# Patient Record
Sex: FEMALE | Race: OTHER | Employment: FULL TIME | URBAN - METROPOLITAN AREA
[De-identification: names, ages, dates, MRNs, and addresses within clinical notes are randomized per-mention and may not be internally consistent; named-entity substitution may affect disease eponyms.]

---

## 2019-02-07 ASSESSMENT — PAIN DESCRIPTION - PAIN TYPE: TYPE: ACUTE PAIN

## 2019-02-07 ASSESSMENT — PAIN SCALES - GENERAL: PAINLEVEL_OUTOF10: 9

## 2019-02-07 ASSESSMENT — PAIN DESCRIPTION - LOCATION: LOCATION: HEAD

## 2019-02-08 ENCOUNTER — HOSPITAL ENCOUNTER (EMERGENCY)
Age: 24
Discharge: HOME | End: 2019-02-08
Attending: EMERGENCY MEDICINE
Payer: COMMERCIAL

## 2019-02-08 ENCOUNTER — APPOINTMENT (OUTPATIENT)
Dept: CT IMAGING | Age: 24
End: 2019-02-08
Payer: COMMERCIAL

## 2019-02-08 ENCOUNTER — APPOINTMENT (OUTPATIENT)
Dept: GENERAL RADIOLOGY | Age: 24
End: 2019-02-08
Payer: COMMERCIAL

## 2019-02-08 VITALS
TEMPERATURE: 98 F | RESPIRATION RATE: 16 BRPM | DIASTOLIC BLOOD PRESSURE: 68 MMHG | HEART RATE: 78 BPM | BODY MASS INDEX: 20.83 KG/M2 | HEIGHT: 65 IN | OXYGEN SATURATION: 100 % | WEIGHT: 125 LBS | SYSTOLIC BLOOD PRESSURE: 128 MMHG

## 2019-02-08 DIAGNOSIS — T07.XXXA MULTIPLE CONTUSIONS: ICD-10-CM

## 2019-02-08 DIAGNOSIS — S09.90XA CLOSED HEAD INJURY, INITIAL ENCOUNTER: Primary | ICD-10-CM

## 2019-02-08 LAB
PREGNANCY, URINE: NEGATIVE
SPECIFIC GRAVITY, URINE: 1.03 (ref 1–1.03)

## 2019-02-08 PROCEDURE — 72072 X-RAY EXAM THORAC SPINE 3VWS: CPT

## 2019-02-08 PROCEDURE — 99284 EMERGENCY DEPT VISIT MOD MDM: CPT

## 2019-02-08 PROCEDURE — 72110 X-RAY EXAM L-2 SPINE 4/>VWS: CPT

## 2019-02-08 PROCEDURE — 70450 CT HEAD/BRAIN W/O DYE: CPT

## 2019-02-08 PROCEDURE — 72125 CT NECK SPINE W/O DYE: CPT

## 2019-02-08 PROCEDURE — 81025 URINE PREGNANCY TEST: CPT

## 2019-02-08 RX ORDER — NAPROXEN 500 MG/1
500 TABLET ORAL 2 TIMES DAILY PRN
Qty: 20 TABLET | Refills: 0 | Status: ON HOLD | OUTPATIENT
Start: 2019-02-08 | End: 2022-03-17

## 2019-12-23 ENCOUNTER — HOSPITAL ENCOUNTER (OUTPATIENT)
Age: 24
Setting detail: SPECIMEN
Discharge: HOME | End: 2019-12-23
Payer: COMMERCIAL

## 2019-12-23 PROCEDURE — 80307 DRUG TEST PRSMV CHEM ANLYZR: CPT

## 2019-12-24 LAB
BENZODIAZEPINE SCREEN, URINE: NEGATIVE
CANNABINOID SCREEN URINE: NEGATIVE
COCAINE METABOLITE: NEGATIVE
OPIATES, URINE: NEGATIVE
OXYCODONE: NORMAL

## 2021-01-08 ENCOUNTER — HOSPITAL ENCOUNTER (OUTPATIENT)
Dept: PSYCHIATRY | Age: 26
Setting detail: THERAPIES SERIES
Discharge: HOME | End: 2021-01-08
Payer: COMMERCIAL

## 2021-01-08 PROCEDURE — 80305 DRUG TEST PRSMV DIR OPT OBS: CPT

## 2021-01-08 PROCEDURE — 90791 PSYCH DIAGNOSTIC EVALUATION: CPT

## 2021-01-08 ASSESSMENT — LIFESTYLE VARIABLES: HISTORY_ALCOHOL_USE: YES
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
## 2021-01-08 NOTE — PROGRESS NOTES
[address] [place] [address] Urinalysis Laboratory Testing and Medical History      Location: [x] [place] [] Art Shaker D. Albert Cushing, MD., [id number], Medical Director of [facility] Director orders for [address] [place] [address] clinical therapists to collect an urine sample from:    Client: Victoria Turner   : 1995   Case# [id number]    Urine sample will be collected following the collections guidelines provided on Clinical Reference Laboratory [facility] custody form, and completion of the [facility] Non-Federal chain of custody drug screening form. During the course of treatment, randomly a urine sample will be collected, at a minimum of one time a month, more frequently as needed, as part of the clinical outpatient alcohol and drug treatment program at [address] [place] [address]. Medical care recommendation for clients experiencing/reporting  medical concerns, who do not have a family physician and willing to attend  medical care will be assisted in seeking medical care. Clinical providers will refer clients to local family physicians practices as part of the  clients treatment plan and assist the client in gaining access to an appointment. Release of information will be requested to support the  clients seeking medical care. Summary of Medical History  Prior to Admission medications    Medication Sig Start Date End Date Taking?  Authorizing Provider   naproxen (NAPROSYN) 500 MG tablet Take 1 tablet by mouth 2 times daily as needed for Pain 19  Chuck Gaviria DO   predniSONE (DELTASONE) 10 MG tablet Take 6 tabs by mouth once daily for 3 days, then 4 tabs by mouth once daily for 3 days, then 3 tabs by mouth once daily for 3 days, then 2 tabs by mouth daily for 3 days, then 1 tab by mouth once daily for 3 days,     #48 8/15/18   BENTON Doss   diphenhydrAMINE (BENADRYL) 25 MG capsule Take 1 capsule by mouth every 6 hours as needed for Itching 8/15/18   BENTON Doss

## 2021-01-08 NOTE — PROGRESS NOTES
[facility]        Individual  Progress Note    Location: [x] [place] [] [place]                   Patient Name: Marisel Grey   : 1995     Case # :  [id number]  Therapist: Charles Lou        Objective/Service/Time: K 1 Assessment    S-Client is a 22year old  female on probation. Client has 5 probation officers. Her last charge was for possession of Suboxone. Client denies that she uses Suboxone. She completed [employer]  successfully in 2020. Client is on medical marijuana and reports she still drinks alcohol occasionally. O-Client was cooperative, her thought process was logical, her affect full, her mood euthymic, her affect full and speech normal. Client denies any hallucinations or delusions. No HI/SI. A-Completed intake paperwork, began assessment and administered initial UDS. Client was placed in Level 1 treatment. P-Client will return on 1/15/2021 at 8:00am to complete her assessment.          Electronically signed by Charles Lou on 2021 at 9:11 AM

## 2021-01-15 ENCOUNTER — HOSPITAL ENCOUNTER (OUTPATIENT)
Dept: PSYCHIATRY | Age: 26
Setting detail: THERAPIES SERIES
Discharge: HOME | End: 2021-01-15
Payer: COMMERCIAL

## 2021-01-15 NOTE — PROGRESS NOTES
[facility]        Individual  Progress Note    Location: [x] [place] [] [place]                   Patient Name: Amaury Garcia   : 1995     Case # :  [id number]  Therapist: Tony Mcmullen        Objective/Service/Time:     Client called at 7:34am and cancelled her individual session at 8:00am reporting she was too tired to come in, she works third shift.    Counselor called client and rescheduled her for 2021 at 2:00pm.        Electronically signed by Tony Mcmullen on 1/15/2021 at 7:55 AM

## 2021-01-19 ENCOUNTER — APPOINTMENT (OUTPATIENT)
Dept: PSYCHIATRY | Age: 26
End: 2021-01-19
Payer: COMMERCIAL

## 2021-01-19 ENCOUNTER — HOSPITAL ENCOUNTER (OUTPATIENT)
Dept: PSYCHIATRY | Age: 26
Setting detail: THERAPIES SERIES
Discharge: HOME | End: 2021-01-19
Payer: COMMERCIAL

## 2021-01-19 PROCEDURE — 90834 PSYTX W PT 45 MINUTES: CPT

## 2021-01-19 NOTE — PROGRESS NOTES
[address] [facility]        Individual  Progress Note    Location: [x] [place] [] [place]                   Patient Name: Alondra Duran   : 1995     Case # :  [id number]  Therapist: Anjelica Gautam        Objective/Service/Time: K 1 individual    S-Client is a 22year old  female on probation with 4 different PO's. Client is on medical marijuana. O-Client was cooperative, pleasant and oriented x 4 and shared information openly. A-Completed assessment, reviewed last UDS that was positive for [facility] and created a treatment plan. Client was placed in Level 1 group.     P-Client will begin group on Tuesday after her individual session at 2:00pm. Group runs from 3:00pm-5:30pm.         Electronically signed by Anjelica Gautam on 2021 at 2:43 PM
3) Client will obtain 3-5 phone numbers of sober support people and reach out weekly. Evaluation Date: 4/19/2021 Code: C Continue TBD     3. Problem: Legal involvement   a. Goal:  Client will complete required stipulations for probation in 90 days  b. Objectives:   i. 1) Client will participate in updated progress reports for legal system Evaluation Date: 4/19/2021 Code: C Continue TBD  ii. 2) Client will submit to any random UDS and pay on any court/probation fines Evaluation Date: 4/19/2021 Code: C Continue TBD                                 3) Client will meet with  for linkage and referral to                                            community resources Evaluation Date: 4/19/2021 Code: C Continue                               TBD       Defer: Client would like to move out of [place]    Discharge Plan/Instructions: Client will complete her treatment plan and maintain abstinence from all substances except Medical Marijuana. Kristyn Qureshi / 1995 has participated in the treatment plan development outlined above on 1/19/2021.      Selma Torres, [address]  1/19/2021/2:30 PM

## 2021-01-22 ENCOUNTER — APPOINTMENT (OUTPATIENT)
Dept: PSYCHIATRY | Age: 26
End: 2021-01-22
Payer: COMMERCIAL

## 2021-01-26 ENCOUNTER — APPOINTMENT (OUTPATIENT)
Dept: PSYCHIATRY | Age: 26
End: 2021-01-26
Payer: COMMERCIAL

## 2021-01-26 ENCOUNTER — HOSPITAL ENCOUNTER (OUTPATIENT)
Dept: PSYCHIATRY | Age: 26
Setting detail: THERAPIES SERIES
End: 2021-01-26
Payer: COMMERCIAL

## 2021-01-26 ENCOUNTER — HOSPITAL ENCOUNTER (OUTPATIENT)
Dept: PSYCHIATRY | Age: 26
Setting detail: THERAPIES SERIES
Discharge: HOME | End: 2021-01-26
Payer: COMMERCIAL

## 2021-01-26 PROCEDURE — 90834 PSYTX W PT 45 MINUTES: CPT
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
## 2021-01-29 ENCOUNTER — APPOINTMENT (OUTPATIENT)
Dept: PSYCHIATRY | Age: 26
End: 2021-01-29
Payer: COMMERCIAL

## 2021-02-02 ENCOUNTER — APPOINTMENT (OUTPATIENT)
Dept: PSYCHIATRY | Age: 26
End: 2021-02-02
Payer: COMMERCIAL

## 2021-02-02 ENCOUNTER — HOSPITAL ENCOUNTER (OUTPATIENT)
Dept: PSYCHIATRY | Age: 26
Setting detail: THERAPIES SERIES
Discharge: HOME | End: 2021-02-02
Payer: COMMERCIAL

## 2021-02-02 PROCEDURE — 90853 GROUP PSYCHOTHERAPY: CPT

## 2021-02-03 NOTE — GROUP NOTE
612 [facility] Group Therapy Note      2/3/2021    Location:  [employer]    Clients Presents:   [id number], [id number], 8421    Clients Absent: [id number]    Length of session: 1.5 hours    Group Note: OP    Group Type: Co-Ed    New members were welcomed and introduced. Norms and expectations of group were discussed. Content:  Stages of Change: Pre contemplation, Contemplation, Preparation, Action, Maintenance       KARY Quiros  6/7/5899 43:69 AM      Co-Therapist: N/A      [facility] Individual Group Progress Note    Alivia Baez  1995  2/3/2021    Notes on Client Progress in [facility] attended group, introduced herself and reviewed events leading to arrival: presented check information: discussed preparation stage: discussed the stress of being involved with multiple legal systems.        KARY Quiros  4/8/0885 78:96 AM         Co-Therapist: N/A

## 2021-02-05 ENCOUNTER — APPOINTMENT (OUTPATIENT)
Dept: PSYCHIATRY | Age: 26
End: 2021-02-05
Payer: COMMERCIAL

## 2021-02-09 ENCOUNTER — APPOINTMENT (OUTPATIENT)
Dept: PSYCHIATRY | Age: 26
End: 2021-02-09
Payer: COMMERCIAL

## 2021-02-09 ENCOUNTER — HOSPITAL ENCOUNTER (OUTPATIENT)
Dept: PSYCHIATRY | Age: 26
Setting detail: THERAPIES SERIES
Discharge: HOME | End: 2021-02-09
Payer: COMMERCIAL

## 2021-02-09 PROCEDURE — 90834 PSYTX W PT 45 MINUTES: CPT

## 2021-02-09 PROCEDURE — 90853 GROUP PSYCHOTHERAPY: CPT

## 2021-02-09 PROCEDURE — 80305 DRUG TEST PRSMV DIR OPT OBS: CPT

## 2021-02-09 NOTE — PROGRESS NOTES
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 Problem: Substance Use   a. Goal: Client will remain abstinent from all mood-altering substances except medical marijuana in 90 days. b. Objectives:   i. 1) Client will identify 10 problems caused and led to consequences and be able to process with her counselor once a week. Evaluation Date: 4/19/2021 Code: 2/9/2021 continue  ii. 2) Client will acknowledge 8 old people, places, and things that are unhealthy to her recovery journey to share once a week during his individual counseling session. Evaluation Date: 4/19/2021 Code: C Continue TBD  3)  Client will identify environments that are detrimental to her recovery and develop ways to avoid them during individual sessions one time each week with primary counselor. Evaluation Date: 4/19/2021 Code: C Continue TBD    2. Problem: Relapse prevention   a. Goal: Client will learn new relapse prevention skills to avoid relapsing in the future in 90 days  b. Objectives:   i. 1)  Client will complete a relapse prevention workbook and develop a relapse prevention plan and process with her counselor once a week. Evaluation Date: 4/19/2021 Code: C Continue TBD   ii. 2)  Client will stay compliant with medical marijuana doctor/prescription.   Evaluation Date: 4/19/2021 Code: 2/9/2021 continue 3) Client will obtain 3-5 phone numbers of sober support people and reach out weekly. Evaluation Date: 4/19/2021 Code: C Continue TBD     3. Problem: Legal involvement   a. Goal:  Client will complete required stipulations for probation in 90 days  b. Objectives:   i. 1) Client will participate in updated progress reports for legal system Evaluation Date: 4/19/2021 Code: 2/9/2021 continue  ii. 2) Client will submit to any random UDS and pay on any court/probation fines Evaluation Date: 4/19/2021 Code: C Continue TBD                                 3) Client will meet with  for linkage and referral to                                            community resources Evaluation Date: 4/19/2021 Code: C Continue                               TBD       Defer: Client would like to move out of [place]    Discharge Plan/Instructions: Client will complete her treatment plan and maintain abstinence from all substances except Medical Marijuana. Alivia Baez / 1995 has participated in the treatment plan development outlined above on 2/9/2021.      JEFF BallIII  2/9/2021/2:48 PM

## 2021-02-09 NOTE — PROGRESS NOTES
[address] [facility]        Individual  Progress Note    Location: [x] [place]                   Patient Name: Chandrakant Morocho   : 1995     Case # :  [id number]  Therapist: Basia Fofana        Objective/Service/Time: K 1 individual     Goal 1 Objective 1 continue  Goal 1 Objective 2 continue  Goal 3 Objective 1 continue    S-Client is a 22year old Bi Racial female on probation. Client reports she is still smoking marijuana with her MM card. Client shared she had court on the 4th and they took her off [address] [place] [address] and put her on community control. So her Felony 5 will be on her record. Client reports she is still working 3rd shift full time. Client denies any current obstacles. She is living between her mothers and her ex girlfriends home. She shared about the trouble drugs have been for her and she shared she has different goals for herself today. O-Client was cooperative, pleasant and oriented x 4. A-Client has good insight into her AoD use and minimizes the effects of marijuana and how it will hold her back with goals she would like to achieve. Counselor encouraged client to make a pro and con list.     P-Client will attend group at 3:00PM today.        Electronically signed by Basia Fofana on 2021 at 2:51 PM

## 2021-02-12 ENCOUNTER — APPOINTMENT (OUTPATIENT)
Dept: PSYCHIATRY | Age: 26
End: 2021-02-12
Payer: COMMERCIAL

## 2021-02-16 ENCOUNTER — HOSPITAL ENCOUNTER (OUTPATIENT)
Dept: PSYCHIATRY | Age: 26
Setting detail: THERAPIES SERIES
Discharge: HOME | End: 2021-02-16
Payer: COMMERCIAL

## 2021-02-16 ENCOUNTER — APPOINTMENT (OUTPATIENT)
Dept: PSYCHIATRY | Age: 26
End: 2021-02-16
Payer: COMMERCIAL

## 2021-02-16 NOTE — GROUP NOTE
[facility] Group Therapy Note      2/16/2021    Location:  [employer]    Clients Presents: 1676, 0731    Clients Absent: 9905, 9923, 9934, 3974    Length of session: 1.5 hours    Group Note: OP    Group Type: Co-Ed    New members were welcomed and introduced. Norms and expectations of group were discussed. Content: Counselor facilitated client check in information. Counselor presented a topic focused discussion on fear. Client identified how fear has affected his life in addiction and in recovery. Client identified coping skills to overcome fear.      [facility]  2/16/2021 2:34 PM    Co-Therapist: N/A      [facility] Individual Group Progress Note    Frances Jackson  1995 2/16/2021    Notes on Client Progress in Group    Reason for Absence: cancelled due to weather    [facility]  2/16/2021 4:23 PM    Co-Therapist: N/A

## 2021-02-19 ENCOUNTER — APPOINTMENT (OUTPATIENT)
Dept: PSYCHIATRY | Age: 26
End: 2021-02-19
Payer: COMMERCIAL

## 2021-02-23 ENCOUNTER — HOSPITAL ENCOUNTER (OUTPATIENT)
Dept: PSYCHIATRY | Age: 26
Setting detail: THERAPIES SERIES
Discharge: HOME | End: 2021-02-23
Payer: COMMERCIAL

## 2021-02-23 ENCOUNTER — APPOINTMENT (OUTPATIENT)
Dept: PSYCHIATRY | Age: 26
End: 2021-02-23
Payer: COMMERCIAL

## 2021-02-23 PROCEDURE — 90834 PSYTX W PT 45 MINUTES: CPT

## 2021-02-23 PROCEDURE — 90853 GROUP PSYCHOTHERAPY: CPT

## 2021-02-23 NOTE — PROGRESS NOTES
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 Problem: Substance Use   a. Goal: Client will remain abstinent from all mood-altering substances except medical marijuana in 90 days. b. Objectives:   i. 1) Client will identify 10 problems caused and led to consequences and be able to process with her counselor once a week. Evaluation Date: 4/19/2021 Code: 2/9/2021 continue  ii. 2) Client will acknowledge 8 old people, places, and things that are unhealthy to her recovery journey to share once a week during his individual counseling session. Evaluation Date: 4/19/2021 Code: 2/23/2021 achieved  3)  Client will identify environments that are detrimental to her recovery and develop ways to avoid them during individual sessions one time each week with primary counselor. Evaluation Date: 4/19/2021 Code: C Continue TBD    2. Problem: Relapse prevention   a. Goal: Client will learn new relapse prevention skills to avoid relapsing in the future in 90 days  b. Objectives:   i. 1)  Client will complete a relapse prevention workbook and develop a relapse prevention plan and process with her counselor once a week. Evaluation Date: 4/19/2021 Code: C Continue TBD   ii. 2)  Client will stay compliant with medical marijuana doctor/prescription.   Evaluation Date: 4/19/2021 Code: 2/23/2021 continue 3) Client will obtain 3-5 phone numbers of sober support people and reach out weekly. Evaluation Date: 4/19/2021 Code: C Continue TBD     3. Problem: Legal involvement   a. Goal:  Client will complete required stipulations for probation in 90 days  b. Objectives:   i. 1) Client will participate in updated progress reports for legal system Evaluation Date: 4/19/2021 Code: 2/23/2021 continue  ii. 2) Client will submit to any random UDS and pay on any court/probation fines Evaluation Date: 4/19/2021 Code: C Continue TBD                                 3) Client will meet with  for linkage and referral to                                            community resources Evaluation Date: 4/19/2021 Code: C Continue                               TBD       Defer: Client would like to move out of [place]    Discharge Plan/Instructions: Client will complete her treatment plan and maintain abstinence from all substances except Medical Marijuana. Nehemias Goodman / 1995 has participated in the treatment plan development outlined above on 2/23/2021.      JEFF HansonIII  2/23/2021/2:13 PM

## 2021-02-23 NOTE — GROUP NOTE
[address] [facility] Group Therapy Note      2/23/2021    Location:  [employer]    Clients Presents: [id number], 2568, 9116    Clients Absent:  6589,  5723, 9990     Length of session: 1.5 hours    Group Note: OP    Group Type: Co-Ed    New members were welcomed and introduced. Norms and expectations of group were discussed. Content:  Consequences of mood altering substances. KARY Avelar  6/50/7571 2:85 PM        Co-Therapist: N/A      [facility] Individual Group Progress Note    Aroldo Velazquez  1995 2/23/2021    Notes on Client Progress in [address] attended session, introduced himself and events leading to arrival: presented check in information: expressed she does not desire to abstain from smoking marijuana, expressed plans to relocate to [place].            KARY Avelar  0/96/1730 3:84 PM         Co-Therapist: N/A

## 2021-02-23 NOTE — GROUP NOTE
[address] [facility] Group Therapy Note      2/23/2021    Location:  [employer]    Clients Presents: 3662, 4049, 2896    Clients Absent:  0196,  7471, 9912     Length of session: 1.5 hours    Group Note: OP    Group Type: Co-Ed    New members were welcomed and introduced. Norms and expectations of group were discussed. Content:  Consequences of mood altering substances. KARY Rojo  2/07/7405 1:17 PM        Co-Therapist: N/A      [facility] Individual Group Progress Note    Azalea Parks  1995 2/23/2021    Notes on Client Progress in [address] attended and participated in group, introduced herself and events leading to arrival: presented check information: expressed she desires to continue smoking marijuana; admitted she has unresolved issues but unwilling to discuss.             KARY Rojo  6/00/4079 5:69 PM           Co-Therapist: N/A

## 2021-02-23 NOTE — PROGRESS NOTES
[facility]        Individual  Progress Note    Location: [x] [place] [] [place]                   Patient Name: Kristyn Qureshi   : 1995     Case # :  [id number]  Therapist: Selma Torres        Objective/Service/Time: K 1 individual     Goal 1 Objective 2 achieved    S-Client is a 32year old  female. Client reports she is still using her medical marijuana. She shared she is still working at [employer] and is enjoying it. She denies any current obstacles or stressors. Client and counselor explored people, places, and things that are unhealthy for recovery. Client stated, \"I think bars for sure. Even gas stations are not good because I like to zapata. I don't really have any negative people any more\". O-Client was cooperative and pleasant. She shared her thoughts and feelings openly. A-Client has good insight when it comes to all substances except the medical marijuana. She has no intention of ever stopping. 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 Counselor encouraged client to explore her coping skills like spirituality, that she has mentioned she is interested in. P-Client will attend group at 4:00pm today.          Electronically signed by Selma Torres on 2021 at 2:45 PM

## 2021-02-26 ENCOUNTER — APPOINTMENT (OUTPATIENT)
Dept: PSYCHIATRY | Age: 26
End: 2021-02-26
Payer: COMMERCIAL

## 2021-03-01 ENCOUNTER — HOSPITAL ENCOUNTER (OUTPATIENT)
Dept: PSYCHIATRY | Age: 26
Setting detail: THERAPIES SERIES
Discharge: HOME | End: 2021-03-01
Payer: COMMERCIAL

## 2021-03-01 PROCEDURE — 90834 PSYTX W PT 45 MINUTES: CPT

## 2021-03-01 NOTE — PROGRESS NOTES
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 Problem: Substance Use   a. Goal: Client will remain abstinent from all mood-altering substances except medical marijuana in 90 days. b. Objectives:   i. 1) Client will identify 10 problems caused and led to consequences and be able to process with her counselor once a week. Evaluation Date: 4/19/2021 Code: 3/1/2021 achieved  ii. 2) Client will acknowledge 8 old people, places, and things that are unhealthy to her recovery journey to share once a week during his individual counseling session. Evaluation Date: 4/19/2021 Code: 2/23/2021 achieved  3)  Client will identify environments that are detrimental to her recovery and develop ways to avoid them during individual sessions one time each week with primary counselor. Evaluation Date: 4/19/2021 Code: C Continue TBD    2. Problem: Relapse prevention   a. Goal: Client will learn new relapse prevention skills to avoid relapsing in the future in 90 days  b. Objectives:   i. 1)  Client will complete a relapse prevention workbook and develop a relapse prevention plan and process with her counselor once a week. Evaluation Date: 4/19/2021 Code: C Continue TBD   ii. 2)  Client will stay compliant with medical marijuana doctor/prescription.   Evaluation Date: 4/19/2021 Code: 3/1/2021 continue 3) Client will obtain 3-5 phone numbers of sober support people and reach out weekly. Evaluation Date: 4/19/2021 Code: C Continue TBD     3. Problem: Legal involvement   a. Goal:  Client will complete required stipulations for probation in 90 days  b. Objectives:   i. 1) Client will participate in updated progress reports for legal system Evaluation Date: 4/19/2021 Code: 3/1/2021 continue  ii. 2) Client will submit to any random UDS and pay on any court/probation fines Evaluation Date: 4/19/2021 Code: C Continue TBD                                 3) Client will meet with  for linkage and referral to                                            community resources Evaluation Date: 4/19/2021 Code: C Continue                               TBD       Defer: Client would like to move out of [place]    Discharge Plan/Instructions: Client will complete her treatment plan and maintain abstinence from all substances except Medical Marijuana. Alivia Baez / 1995 has participated in the treatment plan development outlined above on 3/1/2021.      JEFF BallIII  3/1/2021/4:13 PM

## 2021-03-01 NOTE — PROGRESS NOTES
[facility]        Individual  Progress Note    Location: [x] [place] [] [place]                   Patient Name: Amaury Garcia   : 1995     Case # :  [id number]  Therapist: Tony Mcmullen        Objective/Service/Time: K 1 individual     Goal 1 Objective 1 achieved  Goal 2 Objective 2 continue    S-Client is a 32year old  female on probation. Client reports she is still using medical marijuana daily. She denies any current obstacles or stressors. Client stated, \"I am so happy. I got hired on for first shift. No more thirds! \". Client shared about problems her AoD use has caused her and shared about some goals she has set for herself. She would like to have her own apartment by summer. She is saving money weekly. O-Client was cooperative, pleasant and shared her thoughts and feelings openly. A-Client has good insight into her AoD use and triggers. She has changed her people and places. She would like to go back to college and become a  and help people. She is setting small goals weekly and achieving them. Counselor encouraged client to attend 12 step meetings.      P-Client will attend group 3/2/2021 at 3:00pm        Electronically signed by Tony Mcmullen on 3/1/2021 at 4:38 PM

## 2021-03-02 ENCOUNTER — APPOINTMENT (OUTPATIENT)
Dept: PSYCHIATRY | Age: 26
End: 2021-03-02
Payer: COMMERCIAL

## 2021-03-02 ENCOUNTER — HOSPITAL ENCOUNTER (OUTPATIENT)
Dept: PSYCHIATRY | Age: 26
Setting detail: THERAPIES SERIES
Discharge: HOME | End: 2021-03-02
Payer: COMMERCIAL

## 2021-03-02 PROCEDURE — 90853 GROUP PSYCHOTHERAPY: CPT

## 2021-03-02 NOTE — GROUP NOTE
612 [facility] Group Therapy Note      3/2/2021    Location:  [facility]    Clients Presents:   6450, 0832, 4534    Clients Absent:  1281, 5873    Length of session: 1.5 hours    Group Note: OP    Group Type: Co-Ed    New members were welcomed and introduced. Norms and expectations of group were discussed. Content:  Reviewing cognitive errors, beliefs, morals and values : secondary consequences associated with mood altering substances.            KARY Coronado  2/9/9915 4:37 PM        Co-Therapist: N/A      [facility] Individual Group Progress Note    Jose Fonseca  1995  3/2/2021    Notes on Client Progress in Group    Reason for Absence: [id number] KARY Martins  8/3/4404 3:99 PM    Co-Therapist: {GERALD U/T:[id number]}

## 2021-03-05 ENCOUNTER — APPOINTMENT (OUTPATIENT)
Dept: PSYCHIATRY | Age: 26
End: 2021-03-05
Payer: COMMERCIAL

## 2021-03-08 ENCOUNTER — HOSPITAL ENCOUNTER (OUTPATIENT)
Dept: PSYCHIATRY | Age: 26
Setting detail: THERAPIES SERIES
Discharge: HOME | End: 2021-03-08
Payer: COMMERCIAL

## 2021-03-08 PROCEDURE — 90834 PSYTX W PT 45 MINUTES: CPT

## 2021-03-08 PROCEDURE — 80305 DRUG TEST PRSMV DIR OPT OBS: CPT

## 2021-03-08 NOTE — PROGRESS NOTES
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 Problem: Substance Use   a. Goal: Client will remain abstinent from all mood-altering substances except medical marijuana in 90 days. b. Objectives:   i. 1) Client will identify 10 problems caused and led to consequences and be able to process with her counselor once a week. Evaluation Date: 4/19/2021 Code: 3/1/2021 achieved  ii. 2) Client will acknowledge 8 old people, places, and things that are unhealthy to her recovery journey to share once a week during his individual counseling session. Evaluation Date: 4/19/2021 Code: 2/23/2021 achieved  3)  Client will identify environments that are detrimental to her recovery and develop ways to avoid them during individual sessions one time each week with primary counselor. Evaluation Date: 4/19/2021 Code: 3/8/2021 achieved    2. Problem: Relapse prevention   a. Goal: Client will learn new relapse prevention skills to avoid relapsing in the future in 90 days  b. Objectives:   i. 1)  Client will complete a relapse prevention workbook and develop a relapse prevention plan and process with her counselor once a week. Evaluation Date: 4/19/2021 Code: C Continue TBD   ii. 2)  Client will stay compliant with medical marijuana doctor/prescription.   Evaluation Date: 4/19/2021 Code: 3/8/2021 continue   3) Client will obtain 3-5 phone numbers of sober support people and reach out weekly. Evaluation Date: 4/19/2021 Code: C Continue TBD     3. Problem: Legal involvement   a. Goal:  Client will complete required stipulations for probation in 90 days  b. Objectives:   i. 1) Client will participate in updated progress reports for legal system Evaluation Date: 4/19/2021 Code: 3/65542 continue  ii. 2) Client will submit to any random UDS and pay on any court/probation fines Evaluation Date: 4/19/2021 Code: C Continue TBD                                 3) Client will meet with  for linkage and referral to                                            community resources Evaluation Date: 4/19/2021 Code: C Continue                               TBD       Defer: Client would like to move out of [place]    Discharge Plan/Instructions: Client will complete her treatment plan and maintain abstinence from all substances except Medical Marijuana. Jeff Mota / 1995 has participated in the treatment plan development outlined above on 3/8/2021.      JEFF GreshamIII  3/8/2021/4:10 PM

## 2021-03-08 NOTE — PROGRESS NOTES
[facility]        Individual  Progress Note    Location: [x] [place] [] [place]                   Patient Name: Mary Jo Wright   : 1995     Case # :  [id number]  Therapist: Saul Phoenix        Objective/Service/Time: K 1 individual     Goal 1 Objective 3 achieved    Client is a 32year old Bi racial female on probation. Client is still using medical marijuana daily. Client denies any current obstacles or stressors. Client shared about environments that are not healthy for her recovery and stated, \"bars, clubs, cole and gas stations. I zapata at cole and gas stations. I haven't been to any bars or clubs and I stay out of the cole but I have to go to the gas station on occasion\". Client shared she is still working ans likes her job. She has good family support and talks to them weekly. O-Client was pleasant and cooperative. She shared her thoughts and feeling openly. A-Client has good insight into her AoD use but does not see that her marijuana use is harmful to her health or her work life. Counselor encouraged client to utilize other coping skills like her spirituality or journal to process thoughts and feelings. P-Client will attend group on Tuesday and continue working on her goals.           Electronically signed by Saul Phoenix on 3/8/2021 at 4:33 PM

## 2021-03-09 ENCOUNTER — APPOINTMENT (OUTPATIENT)
Dept: PSYCHIATRY | Age: 26
End: 2021-03-09
Payer: COMMERCIAL

## 2021-03-09 ENCOUNTER — HOSPITAL ENCOUNTER (OUTPATIENT)
Dept: PSYCHIATRY | Age: 26
Setting detail: THERAPIES SERIES
Discharge: HOME | End: 2021-03-09
Payer: COMMERCIAL

## 2021-03-09 PROCEDURE — 90853 GROUP PSYCHOTHERAPY: CPT

## 2021-03-10 NOTE — GROUP NOTE
[facility] Group Therapy Note      3/10/2021    Location:  [facility]    Clients Presents:  3544, [address] [facility], [address], 7623, 2926    Clients Absent: 1624    Length of session: 1.5 hours    Group Note: OP    Group Type: Co-Ed    New members were welcomed and introduced. Norms and expectations of group were discussed. Content: Diagnostic Criteria for Substance Abuse:  reviewing using more than intended, unsuccessful efforts to control use, great deal of time spent in obtain, use and recover, failure to fulfill major life areas, continued use despite interpersonal problems exacerbated  by use, hazardous situations tolerance, social and leisure.          KARY Graves  6/64/1284 5:41 AM        Co-Therapist: N/A      [facility] Individual Group Progress Note    Artem Coffey  1995  3/10/2021    Notes on Client Progress in [address] attended group session: introduced herself and events leading to her arrival: presented check in information: discussed social and leisure activities involve substance use: continuous use and smoking regardless of secondary consequences: externally motivated         KARY Graves  6/72/1935 2:09 AM         Co-Therapist: N/A

## 2021-03-12 ENCOUNTER — APPOINTMENT (OUTPATIENT)
Dept: PSYCHIATRY | Age: 26
End: 2021-03-12
Payer: COMMERCIAL

## 2021-03-15 ENCOUNTER — HOSPITAL ENCOUNTER (OUTPATIENT)
Dept: PSYCHIATRY | Age: 26
Setting detail: THERAPIES SERIES
Discharge: HOME | End: 2021-03-15
Payer: COMMERCIAL

## 2021-03-15 PROCEDURE — 90834 PSYTX W PT 45 MINUTES: CPT

## 2021-03-15 NOTE — PROGRESS NOTES
[facility]        Individual  Progress Note    Location: [x] [place] [] [place]                   Patient Name: Mago Jaime   : 1995     Case # :  [id number]  Therapist: Garret Lala        Objective/Service/Time: K 1 individual     Goal 2 Objective 2 and 3 continue  Gaol 3 Objective 1 and 2 continue     S-Client is a 32year old Bi racial female on probation. Client reports she is still utilizing her medical marijuana daily. She is checking in with probation in [facility] weekly and submitting to a UDS. Client shared she has good family support and is working full time at [employer]. Client denies any current obstacles or stressors. She has not started her relapse prevention plan yet but is focusing on positive affirmation and spirituality. Client stated, \"I still meditate in the evenings and it helps me get centered. I know working at [employer] is not what I want to do forever. I want to go back to school and I will when the time is right\". O-Client was cooperative and shared her thoughts openly. Client was oriented x 4. A-Client has good insight into her AoD use and how her use leads to consequences. She is not willing to stop using medical marijuana as of yet. She reports more benefit than deficit. Counselor encourages client to utilize other coping skills to reduce anxiety. P-Client will attend group 3/16/2021 and work on her relapse prevention plan.          Electronically signed by Garret Lala on 3/15/2021 at 4:47 PM
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 Problem: Substance Use   a. Goal: Client will remain abstinent from all mood-altering substances except medical marijuana in 90 days. b. Objectives:   i. 1) Client will identify 10 problems caused and led to consequences and be able to process with her counselor once a week. Evaluation Date: 4/19/2021 Code: 3/1/2021 achieved  ii. 2) Client will acknowledge 8 old people, places, and things that are unhealthy to her recovery journey to share once a week during his individual counseling session. Evaluation Date: 4/19/2021 Code: 2/23/2021 achieved  3)  Client will identify environments that are detrimental to her recovery and develop ways to avoid them during individual sessions one time each week with primary counselor. Evaluation Date: 4/19/2021 Code: 3/8/2021 achieved    2. Problem: Relapse prevention   a. Goal: Client will learn new relapse prevention skills to avoid relapsing in the future in 90 days  b. Objectives:   i. 1)  Client will complete a relapse prevention workbook and develop a relapse prevention plan and process with her counselor once a week. Evaluation Date: 4/19/2021 Code: C Continue TBD   ii. 2)  Client will stay compliant with medical marijuana doctor/prescription.   Evaluation Date: 4/19/2021 Code: 3/15/2021 continue   3) Client
Spouse/Significant other

## 2021-03-16 ENCOUNTER — HOSPITAL ENCOUNTER (OUTPATIENT)
Dept: PSYCHIATRY | Age: 26
Setting detail: THERAPIES SERIES
Discharge: HOME | End: 2021-03-16
Payer: COMMERCIAL

## 2021-03-16 ENCOUNTER — APPOINTMENT (OUTPATIENT)
Dept: PSYCHIATRY | Age: 26
End: 2021-03-16
Payer: COMMERCIAL

## 2021-03-16 PROCEDURE — 90853 GROUP PSYCHOTHERAPY: CPT

## 2021-03-16 NOTE — GROUP NOTE
[facility] Group Therapy Note      3/16/2021    Location:  [facility]    Clients Presents:  3927, [address], 7975, 7668    Clients Absent:      Length of session: 1.5 hours    Group Note: OP    Group Type: Co-Ed    New members were welcomed and introduced. Norms and expectations of group were discussed. Content:  Understanding stages of grief and loss: denial, anger, bargaining, depression, acceptance: associated with substance use         URIEL Coronado-CS  0/60/5661 6:92 PM        Co-Therapist: N/A      [facility] Individual Group Progress Note    Jose Fonseca  1995  3/16/2021    Notes on Client Progress in [address] [facility] attended group: introduced herself and events leading to her arrival: presented check in information: identified denial, discussed she daily used for three years, self destructive.          URIEL Coronado-CS  2/24/3287 3:86 PM           Co-Therapist: N/A

## 2021-03-19 ENCOUNTER — APPOINTMENT (OUTPATIENT)
Dept: PSYCHIATRY | Age: 26
End: 2021-03-19
Payer: COMMERCIAL

## 2021-03-22 ENCOUNTER — HOSPITAL ENCOUNTER (OUTPATIENT)
Dept: PSYCHIATRY | Age: 26
Setting detail: THERAPIES SERIES
Discharge: HOME | End: 2021-03-22
Payer: COMMERCIAL

## 2021-03-22 PROCEDURE — 90832 PSYTX W PT 30 MINUTES: CPT

## 2021-03-22 NOTE — PROGRESS NOTES
[facility]        Individual  Progress Note    Location: [x] [place] [] [place]                   Patient Name: Alondra Duran   : 1995     Case # :  [id number]  Therapist: Anjelica Gautam        Objective/Service/Time: K .5 Telehealth  This client has stated her name, the last 4 of SS #, that she is in a confidential location and that she is willing to participate in a Tele-Health individual session. Goal 2 Objective 1 continue relapse workbook    S-Client is a 32year old bi racial female on probation. Client reports she is still using medical marijuana. Client shared she got stuck at the [employer], she is getting her car fixed and it is taking longer than expected. Client also shared she check in with probation last week and he reports he got the update from [address] [place] [address]. Client shared she is working on her relapse prevention workbook still and is almost finished. She stays away from old people and does not hang out in the bars any longer. Client shared she still desires to attend college but is afraid she may become over whelmed. O-Client was cooperative and shared her thoughts and feeling openly. A-Client has good insight into her AoD use but tends to minimize how marijuana affects her thinking and her motivation. She does admit it affects her bank account. Counselor encouraged client to attend online support meetings for support.      P-Client will attend group tomorrow at 3:00pm    Electronically signed by Anjelica Gautam on 3/22/2021 at 4:31 PM

## 2021-03-22 NOTE — PROGRESS NOTES
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 Problem: Substance Use   a. Goal: Client will remain abstinent from all mood-altering substances except medical marijuana in 90 days. b. Objectives:   i. 1) Client will identify 10 problems caused and led to consequences and be able to process with her counselor once a week. Evaluation Date: 4/19/2021 Code: 3/1/2021 achieved  ii. 2) Client will acknowledge 8 old people, places, and things that are unhealthy to her recovery journey to share once a week during his individual counseling session. Evaluation Date: 4/19/2021 Code: 2/23/2021 achieved  3)  Client will identify environments that are detrimental to her recovery and develop ways to avoid them during individual sessions one time each week with primary counselor. Evaluation Date: 4/19/2021 Code: 3/8/2021 achieved    2. Problem: Relapse prevention   a. Goal: Client will learn new relapse prevention skills to avoid relapsing in the future in 90 days  b. Objectives:   i. 1)  Client will complete a relapse prevention workbook and develop a relapse prevention plan and process with her counselor once a week. Evaluation Date: 4/19/2021 Code: 3/22/2021 continue  Client reports she stays away from old people and out of the bars.    ii. 2)  Client will stay compliant with medical marijuana doctor/prescription. Evaluation Date: 4/19/2021 Code: 3/22/2021 continue   3) Client will obtain 3-5 phone numbers of sober support people and reach out weekly. Evaluation Date: 4/19/2021 Code: 3/15/2021 continue :Client has mom, grandma, most of her family except dad    3. Problem: Legal involvement   a. Goal:  Client will complete required stipulations for probation in 90 days  b. Objectives:   i. 1) Client will participate in updated progress reports for legal system Evaluation Date: 4/19/2021 Code: 3/089630 continue  ii. 2) Client will submit to any random UDS and pay on any court/probation fines Evaluation Date: 4/19/2021 Code: 3/15/2021 continue weekly [address] [place] [address]                                 3) Client will meet with  for linkage and referral to                                            community resources Evaluation Date: 4/19/2021 Code: C Continue                               TBD       Defer: Client would like to move out of [place]    Discharge Plan/Instructions: Client will complete her treatment plan and maintain abstinence from all substances except Medical Marijuana. Cheyanne Salas / 1995 has participated in the treatment plan development outlined above on 3/22/2021.      BRIAN Diego  3/22/2021/4:15 PM

## 2021-03-23 ENCOUNTER — HOSPITAL ENCOUNTER (OUTPATIENT)
Dept: PSYCHIATRY | Age: 26
Setting detail: THERAPIES SERIES
Discharge: HOME | End: 2021-03-23
Payer: COMMERCIAL

## 2021-03-23 ENCOUNTER — APPOINTMENT (OUTPATIENT)
Dept: PSYCHIATRY | Age: 26
End: 2021-03-23
Payer: COMMERCIAL

## 2021-03-23 PROCEDURE — 90853 GROUP PSYCHOTHERAPY: CPT
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
## 2021-03-26 ENCOUNTER — APPOINTMENT (OUTPATIENT)
Dept: PSYCHIATRY | Age: 26
End: 2021-03-26
Payer: COMMERCIAL

## 2021-03-29 ENCOUNTER — HOSPITAL ENCOUNTER (OUTPATIENT)
Dept: PSYCHIATRY | Age: 26
Setting detail: THERAPIES SERIES
End: 2021-03-29
Payer: COMMERCIAL

## 2021-03-30 ENCOUNTER — HOSPITAL ENCOUNTER (OUTPATIENT)
Dept: PSYCHIATRY | Age: 26
Setting detail: THERAPIES SERIES
Discharge: HOME | End: 2021-03-30
Payer: COMMERCIAL

## 2021-03-30 ENCOUNTER — APPOINTMENT (OUTPATIENT)
Dept: PSYCHIATRY | Age: 26
End: 2021-03-30
Payer: COMMERCIAL
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
## 2021-04-02 ENCOUNTER — APPOINTMENT (OUTPATIENT)
Dept: PSYCHIATRY | Age: 26
End: 2021-04-02
Payer: COMMERCIAL

## 2021-04-05 ENCOUNTER — APPOINTMENT (OUTPATIENT)
Dept: PSYCHIATRY | Age: 26
End: 2021-04-05
Payer: COMMERCIAL

## 2021-04-06 ENCOUNTER — APPOINTMENT (OUTPATIENT)
Dept: PSYCHIATRY | Age: 26
End: 2021-04-06
Payer: COMMERCIAL

## 2021-04-06 ENCOUNTER — HOSPITAL ENCOUNTER (OUTPATIENT)
Dept: PSYCHIATRY | Age: 26
Setting detail: THERAPIES SERIES
Discharge: HOME | End: 2021-04-06
Payer: COMMERCIAL

## 2021-04-07 NOTE — GROUP NOTE
612 [facility] Group Therapy Note      4/7/2021    Location:  [employer]    Clients Presents: [id number], 6463    Clients Absent: 1696, 1838, 6798, 4189    Length of session: 1.5 hours    Group Note: OP    Group Type: Co-Ed    New members were welcomed and introduced. Norms and expectations of group were discussed.       Content: Reducing Stress: identifying stress, differentiation between creating stress and life stress; impact of stress and relapse; techniques and planning to reduce stress         Frankey Brasil, LICDC-CS  0/2/9805 54:54 AM        Co-Therapist: N/A      [facility] Individual Group Progress Note    Eduar Mooney  1995 4/7/2021    Notes on Client Progress in Group        Cancelled session         Frankey Brasil, LICDC-CS  7/4/3902 59:26 PM         Co-Therapist: N/A

## 2021-04-09 ENCOUNTER — APPOINTMENT (OUTPATIENT)
Dept: PSYCHIATRY | Age: 26
End: 2021-04-09
Payer: COMMERCIAL

## 2021-04-12 ENCOUNTER — HOSPITAL ENCOUNTER (OUTPATIENT)
Dept: PSYCHIATRY | Age: 26
Setting detail: THERAPIES SERIES
Discharge: HOME | End: 2021-04-12
Payer: COMMERCIAL

## 2021-04-12 PROCEDURE — 90834 PSYTX W PT 45 MINUTES: CPT

## 2021-04-12 NOTE — PROGRESS NOTES
612 [facility]        Individual  Progress Note    Location: [x] [place] [] Ronel Arndt                   Patient Name: Norm Smith   : 1995     Case # :  [id number]  Therapist: Alesha Boyd        Objective/Service/Time: K 1 Tele health    Goal 2 Objective 1 achieved completed relapse prevention workbook    S-Client is a 32year old bi racial female. Client reports she tested positive for COVID and is in quarantine until 2021. Client reports she is still using her medical marijuana. She denies any other illicit use. Client has not been working but will return after quarantine is up. Client shared she has been meditating and using online meetings to reduce stress of being sick. She reaches out to her support weekly. Client reports she continue to check in with probation. Client denies any current obstacles other than being ill. O-Client was cooperative, pleasant and oriented x 4. Client was not as talkative as usual.     A-Client has good insight into her illicit use other than marijuana. She has set boundaries with old people. Client has family support. She mentioned today about going to [facility] as a fast track to gain housing. She is unsure if she wants to do this. Client is staying with family right now. Counselor urged client to get on the wait list as it is lengthy. P-Client will attend the 3:00pm group on 2021 to complete group and she will submit to a UDS.  Her next individual session will be 2021 at 4:00pm.         Electronically signed by Alesha Boyd on 2021 at 4:30 PM
Evaluation Date: 4/19/2021 Code: 4/12/2021 continue   3) Client will obtain 3-5 phone numbers of sober support people and reach out weekly. Evaluation Date: 4/19/2021 Code: 3/15/2021 continue :Client has mom, grandma, most of her family except dad    3. Problem: Legal involvement   a. Goal:  Client will complete required stipulations for probation in 90 days  b. Objectives:   i. 1) Client will participate in updated progress reports for legal system Evaluation Date: 4/19/2021 Code: 4/12/2021 continue  ii. 2) Client will submit to any random UDS and pay on any court/probation fines Evaluation Date: 4/19/2021 Code: 3/15/2021 continue weekly [facility]                                 3) Client will meet with  for linkage and referral to                                            community resources Evaluation Date: 4/19/2021 Code: C Continue                               TBD       Defer: Client would like to move out of [address] [place] [address]    Discharge Plan/Instructions: Client will complete her treatment plan and maintain abstinence from all substances except Medical Marijuana. Ioana Galarza / 1995 has participated in the treatment plan development outlined above on 4/12/2021.      BRIAN Santana  4/12/2021/4:16 PM

## 2021-04-13 ENCOUNTER — APPOINTMENT (OUTPATIENT)
Dept: PSYCHIATRY | Age: 26
End: 2021-04-13
Payer: COMMERCIAL

## 2021-04-19 ENCOUNTER — APPOINTMENT (OUTPATIENT)
Dept: PSYCHIATRY | Age: 26
End: 2021-04-19
Payer: COMMERCIAL

## 2021-04-20 ENCOUNTER — HOSPITAL ENCOUNTER (OUTPATIENT)
Dept: PSYCHIATRY | Age: 26
Setting detail: THERAPIES SERIES
Discharge: HOME | End: 2021-04-20
Payer: COMMERCIAL

## 2021-04-20 PROCEDURE — 80305 DRUG TEST PRSMV DIR OPT OBS: CPT

## 2021-04-20 PROCEDURE — 90853 GROUP PSYCHOTHERAPY: CPT

## 2021-04-20 NOTE — GROUP NOTE
612 [facility] Group Therapy Note      4/20/2021    Location:  [place]      Clients Presents:  0091, [address] [place], [address] [facility], [facility]    Clients Absent:  3231, 0957    Length of session: 1.5 hours    Group Note: OP    Group Type: Co-Ed    New members were welcomed and introduced. Norms and expectations of group were discussed. Content:  Recognizing and Identifying Toxic, Abusive and dysfunctional relationships and the correlation of substance use. KARY Bernard  7/97/3184 4:07 PM        Co-Therapist: N/A      [facility] Individual Group Progress Note    Jada Wesley  1995 4/20/2021    Notes on Client Progress in [address] [facility] attended and participated in group session: introduced herself and events leading to her arrival: presented check in information: indicated majority of relationships involve substance use: she struggles with a lot of trauma and pain: identified with passive aggressive, instrument and explosive anger.          KARY Bernard  1/73/1033 5:49 PM        Co-Therapist: N/A

## 2021-04-26 ENCOUNTER — APPOINTMENT (OUTPATIENT)
Dept: PSYCHIATRY | Age: 26
End: 2021-04-26
Payer: COMMERCIAL

## 2021-04-26 ENCOUNTER — HOSPITAL ENCOUNTER (OUTPATIENT)
Dept: PSYCHIATRY | Age: 26
Setting detail: THERAPIES SERIES
Discharge: HOME | End: 2021-04-26
Payer: COMMERCIAL

## 2021-04-26 PROCEDURE — 90834 PSYTX W PT 45 MINUTES: CPT

## 2021-04-26 PROCEDURE — 80305 DRUG TEST PRSMV DIR OPT OBS: CPT

## 2021-04-26 NOTE — PROGRESS NOTES
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 Problem: History of Substance use      a. Goal: Enhance personalized knowledge and insight associated with mood altering substances x 90 days     b. Objectives:   i. 1) Remind self of detrimental consequences in major life areas regarding AoD use in 90 days Evaluation Date: 7/26/2021 Code: C Continue TBD     ii. 2) Identify 4 to 8 benefits and gratitudes due to remaining substance free in 90 days: Evaluation Date: 7/26/2021 Code: C Continue TBD     iii. 3) Identify 4 relapse triggers, define relapse, difference between internal and external triggers associated with substance use in 90 days and Evaluation Date: 7/26/2021 Code: C Continue TBD     2. Problem: Involved in Legal System      a. Goal: Finalize and complete required stipulations and requirements for court in 90 days      b. Objectives:   i. 1) Participate actively in updated progress notes, urine screens for legal system in 90 days:  Evaluation Date: 7/26/2021 Code: C Continue TBD      ii. 2) Attend required meetings, court fines and other ramifications of probation, court in 90 days Evaluation Date: 7/26/2021 Code: C Continue TBD      iii.  3)  Utilize, if needed case management services provided by [facility] to enhance abstaining from substance use Evaluation Date: 7/26/2021 Code: C Continue TBD 2. Problem: History of Relapse x 90 days     a. Goal: Identify and address the core dynamics and dilemmas that are perpetuating consequences and exacerbate relapses and triggers and in 90 days         b. Objectives:   i. 1)  Identify 3-5 sober support people and reach out weekly for support in 90 days Evaluation Date: 7/26/2021 Code: C Continue TBD     2) Enhance 4 to 8 healthy techniques and coping skills, relapse prevention, maintain medical marijuana compliance  x 90 days Evaluation Date: 7/26/2021 Code: C Continue TBD    3) Attend 1-2 AA/NA meetings biweekly in person or online in 90 days. Evaluation Date: 7/26/2021 Code: C Continue TBD    Defer: Client would like to be in her own place (home). Discharge Plan/Instructions: Client will complete her treatment plan and maintain compliance with her medical marijuana prescription. Jada Wesley / 1995 has participated in the treatment plan development outlined above on 4/26/2021.      Marilu Hampton LCDCIII  4/26/2021/4:16 PM

## 2021-04-26 NOTE — PROGRESS NOTES
612 [facility]        Individual  Progress Note    Location: [x] [place] [] Curtis Dodson                   Patient Name: Sana Alfredo   : 1995     Case # :  [id number]  Therapist: Benigno Zhong        Objective/Service/Time:  K 1 individual    S-Client is a 32year old Bi racial female on probation Client was supposed to complete treatment today but reports she got an [address] on 2021. Client shared she has been drinking 1 time a month along with smoking marijuana. She has a medical marijuana card and is compliant with her physician. She shared her RADHA was . 11 and her court date is May 18th, 2021. All her probation officers know about her new charge. Counselor and client updated her treatment plan and discontinued the initial treatment plan. Client reports she was hanging out with a new girl and trying to impress her. She shared she drank a bottle of wine and was listening to music and speeding. She reports she has been unemployed since her [facility] [address] and has applied to another temp service. Client has been placed back in OP group. O-Client was pleasant, cooperative, and shared information openly. A-Client has little insight into how impulsive she really is and how her choices have lasting effects. Client seems to be in denial. She puts on a very tough exterior. Counselor has not confronted client's denial. Counselor encouraged client to list pro/con of drinking.     P-Client will attend group on Tuesday at 3:00pm and individual session on Wednesday at 3:00pm        Electronically signed by Benigno Zhong on 2021 at 4:49 PM

## 2021-04-26 NOTE — PROGRESS NOTES
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 Problem: Substance Use   a. Goal: Client will remain abstinent from all mood-altering substances except medical marijuana in 90 days. b. Objectives:   i. 1) Client will identify 10 problems caused and led to consequences and be able to process with her counselor once a week. Evaluation Date: 4/19/2021 Code: 3/1/2021 achieved  ii. 2) Client will acknowledge 8 old people, places, and things that are unhealthy to her recovery journey to share once a week during his individual counseling session. Evaluation Date: 4/19/2021 Code: 2/23/2021 achieved  3)  Client will identify environments that are detrimental to her recovery and develop ways to avoid them during individual sessions one time each week with primary counselor. Evaluation Date: 4/19/2021 Code: 3/8/2021 achieved    2. Problem: Relapse prevention   a. Goal: Client will learn new relapse prevention skills to avoid relapsing in the future in 90 days  b. Objectives:   i. 1)  Client will complete a relapse prevention workbook and develop a relapse prevention plan and process with her counselor once a week. Evaluation Date: 4/19/2021 Code: 4/12/2021 achieved  Client reports she uses meditation and online meetings. ii. 2)  Client will stay compliant with medical marijuana doctor/prescription. Evaluation Date: 4/19/2021 Code: 4/26/2021 discontinue   3) Client will obtain 3-5 phone numbers of sober support people and reach out weekly. Evaluation Date: 4/19/2021 Code: 4/26/2021 discontinue :Client has mom, grandma, most of her family except dad    3. Problem: Legal involvement   a. Goal:  Client will complete required stipulations for probation in 90 days  b. Objectives:   i. 1) Client will participate in updated progress reports for legal system Evaluation Date: 4/19/2021 Code: 4/26/2021 discontinue  ii. 2) Client will submit to any random UDS and pay on any court/probation fines Evaluation Date: 4/19/2021 Code: 4/26/2021 discontinue weekly [facility]                                 3) Client will meet with  for linkage and referral to                                            community resources Evaluation Date: 4/19/2021 Code: 4/26/2021 Discontinue                            Defer: Client would like to move out of [address] [place] [address]    Discharge Plan/Instructions: Client will complete her treatment plan and maintain abstinence from all substances except Medical Marijuana. Norm Smith / 1995 has participated in the treatment plan development outlined above on 4/26/2021.      BRIAN Goldsmith  4/26/2021/4:14 PM

## 2021-04-27 ENCOUNTER — HOSPITAL ENCOUNTER (OUTPATIENT)
Dept: PSYCHIATRY | Age: 26
Setting detail: THERAPIES SERIES
Discharge: HOME | End: 2021-04-27
Payer: COMMERCIAL

## 2021-04-27 PROCEDURE — 90853 GROUP PSYCHOTHERAPY: CPT

## 2021-04-27 NOTE — GROUP NOTE
612 [facility] Group Therapy Note      4/27/2021    Location:  [employer]    Clients Presents: 5761, [address], [address] [facility] [address] [address], [address] [facility], 7638, 2980    Clients Absent:     Length of session: 1.5 hours    Group Note: OP    Group Type: Co-Ed    New members were welcomed and introduced. Norms and expectations of group were discussed. Content: Denial: addressing internal denial: information concerning moral, values, bias, excuses, justification and the impact of substance use. URIEL Vela-CS  0/74/8817 0:02 PM        Co-Therapist: N/A      [facility] Individual Group Progress Note    Yu Erp  1995 4/27/2021    Notes on Client Progress in [address] attended group, introduced herself and events leading to her arrival: presented check in information: informed group she obtained another JONY: therefore still remaining in group.          KARY Vela  7/80/6047 1:66 PM         Co-Therapist: N/A

## 2021-04-30 ENCOUNTER — APPOINTMENT (OUTPATIENT)
Dept: CT IMAGING | Age: 26
End: 2021-04-30
Payer: COMMERCIAL

## 2021-04-30 ENCOUNTER — APPOINTMENT (OUTPATIENT)
Dept: GENERAL RADIOLOGY | Age: 26
End: 2021-04-30
Payer: COMMERCIAL

## 2021-04-30 ENCOUNTER — HOSPITAL ENCOUNTER (EMERGENCY)
Age: 26
Discharge: HOME | End: 2021-04-30
Payer: COMMERCIAL

## 2021-04-30 VITALS
OXYGEN SATURATION: 98 % | WEIGHT: 150 LBS | DIASTOLIC BLOOD PRESSURE: 74 MMHG | RESPIRATION RATE: 18 BRPM | TEMPERATURE: 98.2 F | SYSTOLIC BLOOD PRESSURE: 122 MMHG | HEIGHT: 66 IN | BODY MASS INDEX: 24.11 KG/M2 | HEART RATE: 68 BPM

## 2021-04-30 DIAGNOSIS — J06.9 UPPER RESPIRATORY TRACT INFECTION, UNSPECIFIED TYPE: ICD-10-CM
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
## 2021-04-30 DIAGNOSIS — S30.0XXA CONTUSION OF LOWER BACK, INITIAL ENCOUNTER: ICD-10-CM

## 2021-04-30 DIAGNOSIS — S09.90XA CLOSED HEAD INJURY, INITIAL ENCOUNTER: ICD-10-CM

## 2021-04-30 PROCEDURE — 72100 X-RAY EXAM L-S SPINE 2/3 VWS: CPT

## 2021-04-30 PROCEDURE — 99285 EMERGENCY DEPT VISIT HI MDM: CPT

## 2021-04-30 PROCEDURE — 6370000000 HC RX 637 (ALT 250 FOR IP): Performed by: PHYSICIAN ASSISTANT

## 2021-04-30 PROCEDURE — 72125 CT NECK SPINE W/O DYE: CPT

## 2021-04-30 PROCEDURE — 70450 CT HEAD/BRAIN W/O DYE: CPT

## 2021-04-30 PROCEDURE — 71045 X-RAY EXAM CHEST 1 VIEW: CPT

## 2021-04-30 RX ORDER — GUAIFENESIN/DEXTROMETHORPHAN 100-10MG/5
5 SYRUP ORAL 3 TIMES DAILY PRN
Qty: 120 ML | Refills: 0 | Status: SHIPPED | OUTPATIENT
Start: 2021-04-30 | End: 2021-05-08

## 2021-04-30 RX ORDER — FLUTICASONE PROPIONATE 50 MCG
1 SPRAY, SUSPENSION (ML) NASAL DAILY
Qty: 1 BOTTLE | Refills: 0 | Status: ON HOLD | OUTPATIENT
Start: 2021-04-30 | End: 2022-03-17

## 2021-04-30 RX ORDER — METHOCARBAMOL 500 MG/1
500 TABLET, FILM COATED ORAL 4 TIMES DAILY
Qty: 40 TABLET | Refills: 0 | Status: SHIPPED | OUTPATIENT
Start: 2021-04-30 | End: 2021-05-10

## 2021-04-30 RX ORDER — CETIRIZINE HYDROCHLORIDE 10 MG/1
10 TABLET ORAL DAILY
Qty: 30 TABLET | Refills: 0 | Status: ON HOLD | OUTPATIENT
Start: 2021-04-30 | End: 2022-03-17

## 2021-04-30 RX ORDER — METHOCARBAMOL 750 MG/1
750 TABLET, FILM COATED ORAL ONCE
Status: COMPLETED | OUTPATIENT
Start: 2021-04-30 | End: 2021-04-30

## 2021-04-30 RX ORDER — BUTALBITAL, ACETAMINOPHEN AND CAFFEINE 50; 325; 40 MG/1; MG/1; MG/1
1 TABLET ORAL ONCE
Status: COMPLETED | OUTPATIENT
Start: 2021-04-30 | End: 2021-04-30

## 2021-04-30 RX ADMIN — METHOCARBAMOL TABLETS 750 MG: 750 TABLET, COATED ORAL at 13:10

## 2021-04-30 RX ADMIN — BUTALBITAL, ACETAMINOPHEN AND CAFFEINE 1 TABLET: 50; 325; 40 TABLET ORAL at 13:10

## 2021-04-30 ASSESSMENT — PAIN SCALES - GENERAL: PAINLEVEL_OUTOF10: 6

## 2021-04-30 ASSESSMENT — PAIN DESCRIPTION - LOCATION: LOCATION: HEAD

## 2021-04-30 NOTE — ED NOTES
Bed: ED-21  Expected date:   Expected time:   Means of arrival: Walk In  Comments:     Nidia Brasher RN  04/30/21 0116
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
## 2021-04-30 NOTE — ED NOTES
Pt presents to ED from home for headache and nasal congestion. Pt states she fell backwards yesterday and has had a headache since, no LOC or blurred vision. Pt states she has \"had a cold for over a week\". Pt is on room air with RR unlabored.  A&O x4      Gregory Martinez RN  04/30/21 [id number]

## 2021-04-30 NOTE — ED PROVIDER NOTES
As physician-in-triage, I performed a medical screening history and physical exam on this patient. HISTORY OF PRESENT ILLNESS  Sana Alfredo is a 32 y.o. female who presents following a fall. Patient reports that she fell the night before last landing on her back. Patient did strike her head. Patient initially was not feeling that bad however after waking up today her pain seemed to worsen. Patient is having a headache that is primarily right-sided, radiating into the neck laterally and jaw. Some associated low back pains. Additionally, patient with cough and runny nose and nasal congestion for the past few days. Patient was diagnosed with COVID-19 2 weeks ago but \"did not have any symptoms and completed my quarantine\". PHYSICAL EXAM  /71   Pulse 65   Temp 98.4 °F (36.9 °C) (Oral)   Resp 16   Ht 5' 5.5\" (1.664 m)   Wt 150 lb (68 kg)   LMP 04/23/2021   SpO2 100%   BMI 24.58 kg/m²     On exam, the patient appears in no acute distress. Speech is clear. Breathing is unlabored. Moves all extremities. Comment: Please note this report has been produced using speech recognition software and may contain errors related to that system including errors in grammar, punctuation, and spelling, as well as words and phrases that may be inappropriate. If there are any questions or concerns please feel free to contact the dictating provider for clarification.        Shira Billy MD  04/30/21 1007

## 2021-05-04 ENCOUNTER — HOSPITAL ENCOUNTER (OUTPATIENT)
Dept: PSYCHIATRY | Age: 26
Setting detail: THERAPIES SERIES
Discharge: HOME | End: 2021-05-04
Payer: COMMERCIAL

## 2021-05-04 PROCEDURE — 90853 GROUP PSYCHOTHERAPY: CPT

## 2021-05-04 NOTE — GROUP NOTE
[facility] Group Therapy Note      5/4/2021    Location:  [place]    Clients Presents: 2895, [address], [address], 1855, 5330    Clients Absent: [id number]    Length of session: 1.5 hours    Group Note: OP    Group Type: Co-Ed    New members were welcomed and introduced. Norms and expectations of group were discussed. Content: Understanding denial and loss of control: substance use. KARY Rodríguez  6/2/5974 3:13 PM          Co-Therapist: N/A      [facility] Individual Group Progress Note    Sean Van  1995 5/4/2021    Notes on Client Progress in [address] [facility] attended an participated in group: introduced herself and events leading to her arrival: presented check in information: expressed her co dependant behavior generates problems in her life.            KARY Rodríguez  6/1/2205 9:66 PM         Co-Therapist: N/A

## 2021-05-05 ENCOUNTER — HOSPITAL ENCOUNTER (OUTPATIENT)
Dept: PSYCHIATRY | Age: 26
Setting detail: THERAPIES SERIES
Discharge: HOME | End: 2021-05-05
Payer: COMMERCIAL

## 2021-05-05 PROCEDURE — 90834 PSYTX W PT 45 MINUTES: CPT

## 2021-05-05 NOTE — PROGRESS NOTES
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 Problem: History of Substance use      a. Goal: Enhance personalized knowledge and insight associated with mood altering substances x 90 days     b. Objectives:   i. 1) Remind self of detrimental consequences in major life areas regarding AoD use in 90 days Evaluation Date: 7/26/2021 Code: 5/5/2020 Continue Client shared she has \"lost jobs due to marijuana, lost freedom by going to [facility], she has neglected her family and she has dishonored herself\". ii. 2) Identify 4 to 8 benefits and gratitudes due to remaining substance free in 90 days: Evaluation Date: 7/26/2021 Code: C Continue TBD     iii. 3) Identify 4 relapse triggers, define relapse, difference between internal and external triggers associated with substance use in 90 days and Evaluation Date: 7/26/2021 Code: C Continue TBD     2. Problem: Involved in Legal System      a. Goal: Finalize and complete required stipulations and requirements for court in 90 days      b. Objectives:   i. 1) Participate actively in updated progress notes, urine screens for legal system in 90 days:  Evaluation Date: 7/26/2021 Code: C Continue TBD      ii. 2) Attend required meetings, court fines and other ramifications of probation, court in 90 days Evaluation Date: 7/26/2021 Code: C Continue TBD      iii.  3)  Utilize, if needed case management services provided by [facility] to enhance abstaining from substance use Evaluation Date: 7/26/2021 Code: C Continue TBD         2. Problem: History of Relapse x 90 days     a. Goal: Identify and address the core dynamics and dilemmas that are perpetuating consequences and exacerbate relapses and triggers and in 90 days         b. Objectives:   i. 1)  Identify 3-5 sober support people and reach out weekly for support in 90 days Evaluation Date: 7/26/2021 Code: C Continue TBD     2) Enhance 4 to 8 healthy techniques and coping skills, relapse prevention, maintain medical marijuana compliance  x 90 days Evaluation Date: 7/26/2021 Code: C Continue TBD    3) Attend 1-2 AA/NA meetings biweekly in person or online in 90 days. Evaluation Date: 7/26/2021 Code: C Continue TBD    Defer: Client would like to be in her own place (home). Discharge Plan/Instructions: Client will complete her treatment plan and maintain compliance with her medical marijuana prescription. Millard Nissen / 1995 has participated in the treatment plan development outlined above on 5/5/2021.      JEFF GonzalezIII  5/5/2021/3:12 PM

## 2021-05-05 NOTE — PROGRESS NOTES
[address] [facility]        Individual  Progress Note    Location: [x] [place] [] Catia Bowles                   Patient Name: Jim Gorman   : 1995     Case # :  [id number]  Therapist: Canelo Castaneda        Objective/Service/Time: K 1 individual     Goal 1 Objective 1 Continue    S-Client is a 32year old  female on probation. Client denies any use of any substance except marijuaua and she has a medical card. Client reports she has decided not to get a job until after her court date. Client stated, \"I am not sure if I am going to do group home time. I don't want to get a job and then lose it\". Client denies any current obstacles. She reports her sister had a baby yesterday so she has been pretty happy. Client shared about the consequences she has had due to her substances use and reports, \"I have lost jobs due to smelling like weed, I have lost my freedom by going to group home. I have neglected my family and most of all I have dishonored myself\". O-Client was cooperative, pleasant and oriented x 4. She shared information openly. A-Client has some insight into her use but denies marijuana is hurtful or harmful. 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 Counselor encouraged client to stop self medicating and talk about her feelings she is masking. Client eyes started to tear and she quickly changed topics. Counselor encouraged client to start attending her support groups again. P-Client will continue to attend group and individual sessions weekly.          Electronically signed by Canelo Castaneda on 2021 at 3:51 PM

## 2021-05-11 ENCOUNTER — HOSPITAL ENCOUNTER (OUTPATIENT)
Dept: PSYCHIATRY | Age: 26
Setting detail: THERAPIES SERIES
Discharge: HOME | End: 2021-05-11
Payer: COMMERCIAL

## 2021-05-11 PROCEDURE — 90853 GROUP PSYCHOTHERAPY: CPT

## 2021-05-11 NOTE — GROUP NOTE
[facility] Group Therapy Note      5/11/2021    Location:  [employer]    Clients Presents: [id number], 7387    Clients Absent: [id number], 2667, 4961    Length of session: 1.5 hours    Group Note: OP    Group Type: Co-Ed    New members were welcomed and introduced. Norms and expectations of group were discussed. Content:  Understanding Personality Disorders and Substance Use         KARY Abreu  4/10/6441 7:07 PM        Co-Therapist: N/A      [facility] Individual Group Progress Note    Jolly Estevez  1995 5/11/2021    Notes on Client Progress in [address] attended an participated in group: introduced herself and events leading to her arrival: presented check in information: discussed her mother used crack and onset of smoking marijuana began with father; she discussed parentified and caring for her siblings.          URIEL Abreu-KENDRA  9/52/8562 3:61 PM       Co-Therapist: N/A

## 2021-05-12 ENCOUNTER — HOSPITAL ENCOUNTER (OUTPATIENT)
Dept: PSYCHIATRY | Age: 26
Setting detail: THERAPIES SERIES
Discharge: HOME | End: 2021-05-12
Payer: COMMERCIAL

## 2021-05-12 PROCEDURE — 90834 PSYTX W PT 45 MINUTES: CPT

## 2021-05-12 NOTE — PROGRESS NOTES
[facility]        Individual  Progress Note    Location: [x] [place] [] Myrtle talley                   Patient Name: Esteban Reed   : 1995     Case # :  [id number]  Therapist: Mira Hines        Objective/Service/Time: K 1 individual     Goal 3 Objective 3 continue    S-Client is a 32year old bi racial female on probation. Client reports she has not used any mood altering substances since her JONY except her medical marijuana. Client reports she is waiting to find a job until after her court hearing on May 18th. Client denies any current obstacles. Client shared she has attended 1 AA meeting in person. She shared she doesn't have a sponsor and is not planning on it. She is just using it for support. Client also shared some trauma surrounding the death of her daughter's father and how it affected her AoD use. She processed how she did not want to feel anything. O-Client was cooperative, pleasant and oriented x 4. A-Client has some insight into her use but still minimizes her medical marijuana use. Client has goals for her future and is set on accomplishing them She has set a goal to spend 1 hour a day focusing on herself in meditation. P-Client will continue to work on abstinence from AoD with the exception of her medical marijuana. Continue to engage in [employer] and OP group.          Electronically signed by Mira Hines on 2021 at 3:49 PM

## 2021-05-12 NOTE — PROGRESS NOTES
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 Problem: History of Substance use      a. Goal: Enhance personalized knowledge and insight associated with mood altering substances x 90 days     b. Objectives:   i. 1) Remind self of detrimental consequences in major life areas regarding AoD use in 90 days Evaluation Date: 7/26/2021 Code: 5/5/2020 Continue Client shared she has \"lost jobs due to marijuana, lost freedom by going to correction, she has neglected her family and she has dishonored herself\". ii. 2) Identify 4 to 8 benefits and gratitudes due to remaining substance free in 90 days: Evaluation Date: 7/26/2021 Code: C Continue TBD     iii. 3) Identify 4 relapse triggers, define relapse, difference between internal and external triggers associated with substance use in 90 days and Evaluation Date: 7/26/2021 Code: C Continue TBD     2. Problem: Involved in Legal System      a. Goal: Finalize and complete required stipulations and requirements for court in 90 days      b. Objectives:   i. 1) Participate actively in updated progress notes, urine screens for legal system in 90 days:  Evaluation Date: 7/26/2021 Code: C Continue TBD      ii. 2) Attend required meetings, court fines and other ramifications of probation, court in 90 days Evaluation Date: 7/26/2021 Code: C Continue TBD      iii.  3)  Utilize, if needed case management services provided by [facility] to enhance abstaining from substance use Evaluation Date: 7/26/2021 Code: C Continue TBD         3. Problem: History of Relapse x 90 days     a. Goal: Identify and address the core dynamics and dilemmas that are perpetuating consequences and exacerbate relapses and triggers and in 90 days         b. Objectives:   i. 1)  Identify 3-5 sober support people and reach out weekly for support in 90 days Evaluation Date: 7/26/2021 Code: C Continue TBD     2) Enhance 4 to 8 healthy techniques and coping skills, relapse prevention, maintain medical marijuana compliance  x 90 days Evaluation Date: 7/26/2021 Code: C Continue TBD    3) Attend 1-2 AA/NA meetings biweekly in person or online in 90 days. Evaluation Date: 7/26/2021 Code: Continue 1 AA meeting  5/12/2021    Defer: Client would like to be in her own place (home). Discharge Plan/Instructions: Client will complete her treatment plan and maintain compliance with her medical marijuana prescription. Mac Ng 1995 has participated in the treatment plan development outlined above on 5/12/2021.      Keely Andujar  5/12/2021/3:13 PM

## 2021-05-18 ENCOUNTER — HOSPITAL ENCOUNTER (OUTPATIENT)
Dept: PSYCHIATRY | Age: 26
Setting detail: THERAPIES SERIES
Discharge: HOME | End: 2021-05-18
Payer: COMMERCIAL

## 2021-05-18 PROCEDURE — 90853 GROUP PSYCHOTHERAPY: CPT

## 2021-05-19 ENCOUNTER — HOSPITAL ENCOUNTER (OUTPATIENT)
Dept: PSYCHIATRY | Age: 26
Setting detail: THERAPIES SERIES
Discharge: HOME | End: 2021-05-19
Payer: COMMERCIAL

## 2021-05-19 PROCEDURE — 80305 DRUG TEST PRSMV DIR OPT OBS: CPT

## 2021-05-19 PROCEDURE — 90834 PSYTX W PT 45 MINUTES: CPT

## 2021-05-19 NOTE — GROUP NOTE
612 [facility] Group Therapy Note      5/19/2021    Location:  [place]    Clients Presents:     Clients Absent:     Length of session: 1.5 hours    Group Note: OP    Group Type: Co-Ed    New members were welcomed and introduced. Norms and expectations of group were discussed. Content: Understanding difference between internal and external stress and substance use. KARY Cristina  1/46/8600 99:05 AM        Co-Therapist: N/A      [facility] Individual Group Progress Note    Tico Villagomez  1995 5/19/2021    Notes on Client Progress in [address] attended and participated in group session: introduced herself and events leading to her arrival: presented check in information: expressed to began being more focused and emphasis in group.        KARY Cristina  6/83/7242 40:34 AM         Co-Therapist: N/A

## 2021-05-19 NOTE — PROGRESS NOTES
612 [facility]        Individual  Progress Note    Location: [x] [place] [] [place]                   Patient Name: Jean Pierre Kenny   : 1995     Case # :  [id number]  Therapist: BRIAN Ornelas        Objective/Service/Time: K 1 individual     Goal 2 Objective 1 & 2 continue   Goal 3 Objective 2 continue    S-Client is a 32year old Bi racial female on probation. Client reports she is still using medical marijuana but has not used any other substances. Client shared she went to her court hearing and plead not guilty and she was given a . She is supposed to get a letter in the mail with her next court date. Client shared she also paid on her court fines while she was there and spoke with her . Client shared she continues to work on her coping skills, such as meditation and exercise. She is playing basketball weekly at the [employer] gym. Client denies any current stressors. She is going to go to [facility] service and see about a part time job. O-Client was pleasant, cooperative, and oriented x 4. A-Client has some insight into her AoD use but minimizes the effects substance use has had on her life. She has been in trouble throughout her adult life it is a direct result of drugs and alcohol. Client has a 11year old daughter her mother is raising but she sees her when ever she wants. Counselor continues to encourage client to reduce marijuana use and utilize other coping skills to manage mental health. P-Client will continue to attend group and individual sessions weekly. Attend AA/NA for support and work on coping skills.            Electronically signed by Dawit Chan on 2021 at 3:40 PM
Date: 7/26/2021 Code: 5/19/2021 Continue Client reports she paid on her fines last week.       iii. 3)  Utilize, if needed case management services provided by [facility] to enhance abstaining from substance use Evaluation Date: 7/26/2021 Code: C Continue TBD         3. Problem: History of Relapse x 90 days     a. Goal: Identify and address the core dynamics and dilemmas that are perpetuating consequences and exacerbate relapses and triggers and in 90 days         b. Objectives:   i. 1)  Identify 3-5 sober support people and reach out weekly for support in 90 days Evaluation Date: 7/26/2021 Code: C Continue TBD     2) Enhance 4 to 8 healthy techniques and coping skills, relapse prevention, maintain medical marijuana compliance  x 90 days Evaluation Date: 7/26/2021 Code: 5/19/2021 Continue Client reports she exercising and mediates daily. 3) Attend 1-2 AA/NA meetings biweekly in person or online in 90 days. Evaluation Date: 7/26/2021 Code: Continue 1 AA meeting  5/12/2021    Defer: Client would like to be in her own place (home). Discharge Plan/Instructions: Client will complete her treatment plan and maintain compliance with her medical marijuana prescription. Cheko Shore / 1995 has participated in the treatment plan development outlined above on 5/19/2021.      Kodi Mitchell  5/19/2021/3:17 PM

## 2021-05-25 ENCOUNTER — HOSPITAL ENCOUNTER (OUTPATIENT)
Dept: PSYCHIATRY | Age: 26
Setting detail: THERAPIES SERIES
Discharge: HOME | End: 2021-05-25
Payer: COMMERCIAL

## 2021-05-25 PROCEDURE — 90853 GROUP PSYCHOTHERAPY: CPT

## 2021-05-26 ENCOUNTER — HOSPITAL ENCOUNTER (OUTPATIENT)
Dept: PSYCHIATRY | Age: 26
Setting detail: THERAPIES SERIES
Discharge: HOME | End: 2021-05-26
Payer: COMMERCIAL

## 2021-05-26 PROCEDURE — 90834 PSYTX W PT 45 MINUTES: CPT

## 2021-05-26 NOTE — PROGRESS NOTES
[facility]:  Patient Assessment and Dispensing Record   Date:  5/26/2021  Patient Name: Nettie Lewis  Patient Address: [address] [place] [id number]         Patient Selection: Check that the following conditions are met  []  The patient is an individual who is experiencing or at risk of experiencing an       opioid-related overdose. [x]  The individual receiving the information and medication is        oriented to person, place, and time and able to understand and learn the        essential components of overdose response and naloxone administration. Indication for dispensing naloxone  []  Previous opioid intoxication or overdose. [x]  History of nonmedical opioid use.   []  Initiation or cessation of methadone or buprenorphine for opioid use disorder        treatment. []  Higher-dose (>50 mg morphine equivalent/day) opioid prescription. []  Receiving any opioid prescription plus (select below):  [] Rotated from one opioid to another because of possible incomplete cross-tolerance. [] Smoking, COPD, emphysema, asthma, sleep apnea, respiratory infection or other respiratory illness. [] Renal dysfunction, hepatic disease, cardiac illness or HIV/AIDS. [] Known or suspected concurrent alcohol use. [] Concurrent benzodiazepine or other sedative prescription. [] Concurrent antidepressant prescription. [] Patients who may have difficulty accessing emergency medical services (distance, remoteness). [x] Voluntary request from a family member, friend,  or other person in a position to assist an individual who is at risk of experiencing an opioid-related overdose. I (the undersigned) attest that:  [x]  I am authorized per hospital protocol to dispense naloxone to this patient. [x]  The individual has been screened for contraindications/precautions and        counseled appropriately. [x]  I have counseled the patient using the protocol approved informational pamphlet. [x]  I have updated the discharge record to reflect this dispensing of naloxone.      Signature:  Raven العلي, [address]  5/26/2021, 3:29 PM

## 2021-05-26 NOTE — GROUP NOTE
612 [facility] Group Therapy Note      2021    Location:  [place]    Clients Presents: 7292, 4951, [address], 9901, 1080    Clients Absent: 9991    Length of session: 1.5 hours    Group Note: OP    Group Type: Co-Ed    New members were welcomed and introduced. Norms and expectations of group were discussed. Content: Identifying self sabotaging Behavior, Thoughts and [facility] [address] [facility]-  3/46/9537 0:98 PM        Co-Therapist: N/A      [facility] Individual Group Progress Note    Augusta Bowles  1995    Notes on Client Progress in [address] attended and participated in group, introduced herself and events leading to arrival: presented check in behavior: discussed being upset, today being the birth day of daughters  fathers, expressed considering approaching the various probations and courts and considering finalizing her [facility] time instead of remaining on probation and restrictions.              Mateusz Childs [facility]-  3967 6:91 PM         Co-Therapist: N/A

## 2021-05-26 NOTE — PROGRESS NOTES
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 Problem: History of Substance use      a. Goal: Enhance personalized knowledge and insight associated with mood altering substances x 90 days     b. Objectives:   i. 1) Remind self of detrimental consequences in major life areas regarding AoD use in 90 days Evaluation Date: 7/26/2021 Code: 5/5/2020 Continue Client shared she has \"lost jobs due to marijuana, lost freedom by going to skilled nursing, she has neglected her family and she has dishonored herself\". ii. 2) Identify 4 to 8 benefits and gratitudes due to remaining substance free in 90 days: Evaluation Date: 7/26/2021 Code: C Continue TBD     iii. 3) Identify 4 relapse triggers, define relapse, difference between internal and external triggers associated with substance use in 90 days and Evaluation Date: 7/26/2021 Code: 5/26/2021 Continue Client reports her biggest trigger is the people she puts herself around. 2.    Problem: Involved in Legal System      a. Goal: Finalize and complete required stipulations and requirements for court in 90 days      b. Objectives:   i. 1) Participate actively in updated progress notes, urine screens for legal system in 90 days:  Evaluation Date: 7/26/2021 Code: 5/26/2021 Continue Client has to check in with both probations tomorrow.       ii. 2) Attend required meetings,

## 2021-06-01 ENCOUNTER — HOSPITAL ENCOUNTER (OUTPATIENT)
Dept: PSYCHIATRY | Age: 26
Setting detail: THERAPIES SERIES
Discharge: HOME | End: 2021-06-01
Payer: COMMERCIAL

## 2021-06-01 PROCEDURE — 90853 GROUP PSYCHOTHERAPY: CPT

## 2021-06-02 ENCOUNTER — HOSPITAL ENCOUNTER (OUTPATIENT)
Dept: PSYCHIATRY | Age: 26
Setting detail: THERAPIES SERIES
Discharge: HOME | End: 2021-06-02
Payer: COMMERCIAL

## 2021-06-02 PROCEDURE — 90834 PSYTX W PT 45 MINUTES: CPT

## 2021-06-02 PROCEDURE — 80305 DRUG TEST PRSMV DIR OPT OBS: CPT

## 2021-06-02 NOTE — GROUP NOTE
612 [facility] Group Therapy Note      6/2/2021    Location:  [facility]    Clients Presents: 3620, 9950, 9931, 1019    Clients Absent: 9983, 9969    Length of session: 1.5 hours    Group Note: OP    Group Type: Co-Ed    New members were welcomed and introduced. Norms and expectations of group were discussed.       Content:  Symptoms of Post Acute Withdrawal Syndrome and Substance Use           KARY Wiggins  5/4/5030 58:96 PM      Co-Therapist: N/A      [facility] Individual Group Progress Note    Ioana Galarza  1995 6/2/2021    Notes on Client Progress in [address] attended and participated in group, introduced herself and events leading to arrival: presented check in behavior:  discussed memory and sleep dilemmas      KARY Wiggins  8/8/4816 88:41 PM       Co-Therapist: N/A

## 2021-06-02 NOTE — PROGRESS NOTES
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 Problem: History of Substance use      a. Goal: Enhance personalized knowledge and insight associated with mood altering substances x 90 days     b. Objectives:   i. 1) Remind self of detrimental consequences in major life areas regarding AoD use in 90 days Evaluation Date: 7/26/2021 Code: 5/5/2020 Continue Client shared she has \"lost jobs due to marijuana, lost freedom by going to intermediate, she has neglected her family and she has dishonored herself\". ii. 2) Identify 4 to 8 benefits and gratitudes due to remaining substance free in 90 days: Evaluation Date: 7/26/2021 Code: C achieved 6/2/2021 Client reports she has a clear head when she doesn't use alcohol or other substances and she is not wasting money. She is not catching more charges.      iii. 3) Identify 4 relapse triggers, define relapse, difference between internal and external triggers associated with substance use in 90 days and Evaluation Date: 7/26/2021 Code: 5/26/2021 Continue Client reports her biggest trigger is the people she puts herself around. 2.    Problem: Involved in Legal System      a. Goal: Finalize and complete required stipulations and requirements for court in 90 days      b. Objectives:   i.  1) Participate actively in updated progress notes, urine screens for legal system in 90 days:  Evaluation Date: 7/26/2021 Code: 5/26/2021 Continue Client has to check in with both probations tomorrow. ii. 2) Attend required meetings, court fines and other ramifications of probation, court in 90 days Evaluation Date: 7/26/2021 Code: 5/19/2021 Continue Client reports she paid on her fines last week.       iii. 3)  Utilize, if needed case management services provided by [facility] to enhance abstaining from substance use Evaluation Date: 7/26/2021 Code: C Continue TBD         3. Problem: History of Relapse x 90 days     a. Goal: Identify and address the core dynamics and dilemmas that are perpetuating consequences and exacerbate relapses and triggers and in 90 days         b. Objectives:   i. 1)  Identify 3-5 sober support people and reach out weekly for support in 90 days Evaluation Date: 7/26/2021 Code: 6/2/2021 Achieved Client reports she talks to her mom and sister. She has girls that she went to [facility] with that she still talks to weekly. 2) Enhance 4 to 8 healthy techniques and coping skills, relapse prevention, maintain medical marijuana compliance  x 90 days Evaluation Date: 7/26/2021 Code: 5/19/2021 Continue Client reports she exercising and mediates daily. 3) Attend 1-2 AA/NA meetings biweekly in person or online in 90 days. Evaluation Date: 7/26/2021 Code: Continue 1 AA meeting  5/12/2021    Defer: Client would like to be in her own place (home). Discharge Plan/Instructions: Client will complete her treatment plan and maintain compliance with her medical marijuana prescription. Gregorio Woods / 1995 has participated in the treatment plan development outlined above on 6/2/2021.      Erica Altamirano, [address]  6/2/2021/3:12 PM

## 2021-06-02 NOTE — PROGRESS NOTES
612 [facility]        Individual  Progress Note    Location: [x] [place] [] [place]                   Patient Name: Jean Pierre Kenny   : 1995     Case # :  [id number]  Therapist: BRIAN Ornelas        Objective/Service/Time: K 1 individual     Goal 1 Objective 2 achieved  Goal 3 Objective 1 achieved    S-Client is a 32year old bi racial female on probation. Client reports she is still using medical marijuana daily but has remained abstinent from all other substances. Client shared she has been talking to family and her [facility] girls for support weekly. Client also shared she is grateful today for not using other substances. She stated, \"I have a clear mind. I am not catching charges and I am not wasting money. I really want to go to school and be a counselor\". Client denies any current obstacles other than her sore throat today. O-Client was cooperative, pleasant and oriented x 4. A-Client has good insight when it come to most substances but minimizes the effect marijuana has on her thinking and motivation. Client is reaching maximum benefit and will complete treatment on 2021 and submitted to a UDS today. P-Client will continue to utilize her support and coping skills to remain abstinent from all mood altering substances except medical marijuana.          Electronically signed by Dawit Chan on 2021 at 3:58 PM

## 2021-06-08 ENCOUNTER — HOSPITAL ENCOUNTER (OUTPATIENT)
Dept: PSYCHIATRY | Age: 26
Setting detail: THERAPIES SERIES
Discharge: HOME | End: 2021-06-08
Payer: COMMERCIAL

## 2021-06-08 PROCEDURE — 90853 GROUP PSYCHOTHERAPY: CPT

## 2021-06-08 NOTE — GROUP NOTE
612 [facility] Group Therapy Note      6/8/2021    Location:  [employer]    Clients Presents:  4169, 4546, 9950, 9931, 1019, 1030    Clients Absent:      Length of session: 1.5 hours    Group Note: OP    Group Type: Co-Ed    New members were welcomed and introduced. Norms and expectations of group were discussed. Content: Denial: Open, Blind, Potential, Secret:  Feng Window and Substance Use         KARY Paris  5/6/6745 9:03 PM        Co-Therapist: N/A      [facility] Individual Group Progress Note    Nettie Lewis  1995 6/8/2021    Notes on Client Progress in [address] attended group, introduced herself and events leading to her arrival, presented check in information: discussed blind phase, initially thought she did not have a problem but when she had to repeat program she recognized her mental problems masked in substance use.          KARY Paris  8/4/4761 3:35 PM         Co-Therapist: N/A

## 2021-06-09 ENCOUNTER — HOSPITAL ENCOUNTER (OUTPATIENT)
Dept: PSYCHIATRY | Age: 26
Setting detail: THERAPIES SERIES
Discharge: HOME | End: 2021-06-09
Payer: COMMERCIAL

## 2021-06-09 NOTE — PROGRESS NOTES
612 [facility]        Individual  Progress Note    Location: [x] [place] [] [place]                   Patient Name: Ila Baig   : 1995     Case # :  [id number]  Therapist: BRIAN Lopez        Objective/Service/Time:     Client cancelled due to having orientation for a job.          Electronically signed by Awilda uRff on 2021 at 2:58 PM

## 2021-06-15 ENCOUNTER — APPOINTMENT (OUTPATIENT)
Dept: PSYCHIATRY | Age: 26
End: 2021-06-15
Payer: COMMERCIAL

## 2021-06-15 ENCOUNTER — HOSPITAL ENCOUNTER (OUTPATIENT)
Dept: PSYCHIATRY | Age: 26
Setting detail: THERAPIES SERIES
Discharge: HOME | End: 2021-06-15
Payer: COMMERCIAL

## 2021-06-15 NOTE — GROUP NOTE
[address] [facility] Group Therapy Note      6/15/2021    Location:  [facility]    Clients Presents:  2588, 1019    Clients Absent: 6431, 9992, 1035, 1038     Length of session: 1.5 hours    Group Note: OP    Group Type: Co-Ed    New members were welcomed and introduced. Norms and expectations of group were discussed.         Content:   Internal and external stress: relapse and substance use         KARY Casarez  8/60/8580 7:50 PM        Co-Therapist: N/A      [facility] Group Progress Note    Ila Baig  1995  6/15/2021    Notes on Client Progress in Group    Cancelled group          KARY Casarez  2/72/2666 0:50 PM         Co-Therapist: N/A

## 2021-06-16 ENCOUNTER — APPOINTMENT (OUTPATIENT)
Dept: PSYCHIATRY | Age: 26
End: 2021-06-16
Payer: COMMERCIAL

## 2021-06-16 ENCOUNTER — HOSPITAL ENCOUNTER (OUTPATIENT)
Dept: PSYCHIATRY | Age: 26
Setting detail: THERAPIES SERIES
Discharge: HOME | End: 2021-06-16
Payer: COMMERCIAL

## 2021-06-16 PROCEDURE — 90834 PSYTX W PT 45 MINUTES: CPT

## 2021-06-16 NOTE — PROGRESS NOTES
612 [facility]        Individual  Progress Note    Location: [x] [place] [] [place]                   Patient Name: Jada Wesley   : 1995     Case # :  [id number]  Therapist: Radha Emery        Objective/Service/Time: K 1 individual     S-Client is a 32year old bi racial female on probation. Client reports she is still using her medical marijuana. She shared she is working through a [employer] and is assigned to [employer] in [place]. She denies any current obstacles or stressors. Client reports she is thinking about quitting her medical marijuana but did not give any exact date. Client shred she is still hoping to get her own place soon. Client has remained compliant with probation and has court on 2021 for her JONY. She signed a LORENA for herself to get a progress report to take to court showing she was in treatment and finished. Client and counselor created a discharge treatment plan and client reports she will remain on her medical marijuana to manage her PTSD and not resort to other substances. O-Client was cooperative, pleasant and oriented x 4. A-Client has some insight into her AoD use but minimizes the damage the marijuana does. She has several goals but tends to not make much progress towards them. She has several probation officers she answers to and is being compliant. She would like to return to college but has not applied yet. Counselor encouraged client to fill out the financial aid form this month for the fall. P-Client will call [facility] if she needs help in the future.          Electronically signed by Radha Emery on 2021 at 3:35 PM

## 2021-06-16 NOTE — PROGRESS NOTES
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 Problem: History of Substance use      a. Goal: Enhance personalized knowledge and insight associated with mood altering substances x 90 days     b. Objectives:   i. 1) Remind self of detrimental consequences in major life areas regarding AoD use in 90 days Evaluation Date: 7/26/2021 Code:  6/16/2021 Discontinued. ii. 2) Identify 4 to 8 benefits and gratitudes due to remaining substance free in 90 days: Evaluation Date: 7/26/2021 Code: C achieved 6/2/2021 Client reports she has a clear head when she doesn't use alcohol or other substances and she is not wasting money. She is not catching more charges. ii. 3) Identify 4 relapse triggers, define relapse, difference between internal and external triggers associated with substance use in 90 days and Evaluation Date: 7/26/2021 Code:  6/16/2021 Discontinued. 2.    Problem: Involved in Legal System      a. Goal: Finalize and complete required stipulations and requirements for court in 90 days      c. Objectives:   i. 1) Participate actively in updated progress notes, urine screens for legal system in 90 days:  Evaluation Date: 7/26/2021 Code: 6/16/2021 Discontinued.       ii. 2) Attend required meetings, court fines and other ramifications of probation, court in 90 days Evaluation Date: 7/26/2021 Code:

## 2021-06-16 NOTE — PROGRESS NOTES
[facility] Discharge Treatment Plan    Norm Smith  1995  Case # [id number]    Location: [x] [place] [] Maninder Scott. Stresses that I need to monitor  1. Having no car right now  2. Getting my own place to live    3. Unsure about my new job  4.     B. Major triggers to using alcohol/drugs   1. Being bored   2. Certain anniversaries   3. Grief     Sober Plan   1. Continue medical marijuana  2.   3.     C. Sobriety Support   1. Friend: Radha Miller   2. Treatment staff: Anette Rai and Zaynab   3. Family member: mom and sister  3. AA/NA recovery program, sponsor, meetings day/times, daily ready: NA and AA meetings     D. Non-using activities  1. Basketball   2. Swimming   3. The park     E. Consequences of Drug/Alcohol use  1. JONY's   2. Loss of money   3. Probation     G. Short term goals to achieve  1. Getting my own place   2. Maintain my new job    H. Follow up recommendations  1. Client is encouraged to continue to stay compliant with her probation stipulations. 2. Client is encouraged to call [facility] if she needs help in the future. Norm Alaniziadan / 1995 has participated in the discharge treatment plan development outlined above on 6/16/2021.      Alesha Boyd, [address]  6/16/2021/3:11 PM

## 2021-06-22 ENCOUNTER — APPOINTMENT (OUTPATIENT)
Dept: PSYCHIATRY | Age: 26
End: 2021-06-22
Payer: COMMERCIAL

## 2021-06-23 ENCOUNTER — APPOINTMENT (OUTPATIENT)
Dept: PSYCHIATRY | Age: 26
End: 2021-06-23
Payer: COMMERCIAL

## 2021-06-29 ENCOUNTER — APPOINTMENT (OUTPATIENT)
Dept: PSYCHIATRY | Age: 26
End: 2021-06-29
Payer: COMMERCIAL

## 2021-06-30 ENCOUNTER — APPOINTMENT (OUTPATIENT)
Dept: PSYCHIATRY | Age: 26
End: 2021-06-30
Payer: COMMERCIAL

## 2022-03-17 ENCOUNTER — APPOINTMENT (OUTPATIENT)
Dept: CT IMAGING | Age: 27
DRG: 247 | End: 2022-03-17
Payer: COMMERCIAL

## 2022-03-17 ENCOUNTER — HOSPITAL ENCOUNTER (INPATIENT)
Age: 27
LOS: 2 days | Discharge: HOME | DRG: 247 | End: 2022-03-19
Attending: EMERGENCY MEDICINE | Admitting: INTERNAL MEDICINE
Payer: COMMERCIAL

## 2022-03-17 ENCOUNTER — APPOINTMENT (OUTPATIENT)
Dept: ULTRASOUND IMAGING | Age: 27
DRG: 247 | End: 2022-03-17
Payer: COMMERCIAL

## 2022-03-17 ENCOUNTER — APPOINTMENT (OUTPATIENT)
Dept: GENERAL RADIOLOGY | Age: 27
DRG: 247 | End: 2022-03-17
Payer: COMMERCIAL

## 2022-03-17 DIAGNOSIS — R11.2 NAUSEA AND VOMITING, INTRACTABILITY OF VOMITING NOT SPECIFIED, UNSPECIFIED VOMITING TYPE: ICD-10-CM

## 2022-03-17 DIAGNOSIS — K56.600 PARTIAL SMALL BOWEL OBSTRUCTION (HCC): Primary | ICD-10-CM

## 2022-03-17 DIAGNOSIS — R10.9 ABDOMINAL PAIN, UNSPECIFIED ABDOMINAL LOCATION: ICD-10-CM

## 2022-03-17 PROBLEM — K56.609 SBO (SMALL BOWEL OBSTRUCTION) (HCC): Status: ACTIVE | Noted: 2022-03-17

## 2022-03-17 LAB
ALBUMIN SERPL-MCNC: 4.1 GM/DL (ref 3.4–5)
ALP BLD-CCNC: 73 IU/L (ref 40–129)
ALT SERPL-CCNC: 10 U/L (ref 10–40)
AMPHETAMINES: NEGATIVE
ANION GAP SERPL CALCULATED.3IONS-SCNC: 11 MMOL/L (ref 4–16)
AST SERPL-CCNC: 14 IU/L (ref 15–37)
BACTERIA: NEGATIVE /HPF
BARBITURATE SCREEN URINE: NEGATIVE
BASOPHILS ABSOLUTE: 0 K/CU MM
BASOPHILS RELATIVE PERCENT: 0.3 % (ref 0–1)
BENZODIAZEPINE SCREEN, URINE: NEGATIVE
BILIRUB SERPL-MCNC: 0.7 MG/DL (ref 0–1)
BILIRUBIN URINE: ABNORMAL MG/DL
BLOOD, URINE: ABNORMAL
BUN BLDV-MCNC: 9 MG/DL (ref 6–23)
CALCIUM SERPL-MCNC: 9.2 MG/DL (ref 8.3–10.6)
CANNABINOID SCREEN URINE: ABNORMAL
CHLORIDE BLD-SCNC: 97 MMOL/L (ref 99–110)
CLARITY: ABNORMAL
CO2: 27 MMOL/L (ref 21–32)
COCAINE METABOLITE: NEGATIVE
COLOR: YELLOW
CREAT SERPL-MCNC: 0.8 MG/DL (ref 0.6–1.1)
DIFFERENTIAL TYPE: ABNORMAL
EOSINOPHILS ABSOLUTE: 0.1 K/CU MM
EOSINOPHILS RELATIVE PERCENT: 0.6 % (ref 0–3)
GFR AFRICAN AMERICAN: >60 ML/MIN/1.73M2
GFR NON-AFRICAN AMERICAN: >60 ML/MIN/1.73M2
GLUCOSE BLD-MCNC: 109 MG/DL (ref 70–99)
GLUCOSE, URINE: NEGATIVE MG/DL
GONADOTROPIN, CHORIONIC (HCG) QUANT: 0.5 UIU/ML
HCT VFR BLD CALC: 47.4 % (ref 37–47)
HEMOGLOBIN: 15.2 GM/DL (ref 12.5–16)
IMMATURE NEUTROPHIL %: 0.5 % (ref 0–0.43)
KETONES, URINE: 15 MG/DL
LEUKOCYTE ESTERASE, URINE: NEGATIVE
LIPASE: 12 IU/L (ref 13–60)
LYMPHOCYTES ABSOLUTE: 1.2 K/CU MM
LYMPHOCYTES RELATIVE PERCENT: 9.7 % (ref 24–44)
MCH RBC QN AUTO: 31 PG (ref 27–31)
MCHC RBC AUTO-ENTMCNC: 32.1 % (ref 32–36)
MCV RBC AUTO: 96.7 FL (ref 78–100)
MONOCYTES ABSOLUTE: 1.6 K/CU MM
MONOCYTES RELATIVE PERCENT: 13.8 % (ref 0–4)
MUCUS: ABNORMAL HPF
NITRITE URINE, QUANTITATIVE: NEGATIVE
NUCLEATED RBC %: 0 %
OPIATES, URINE: NEGATIVE
OXYCODONE: NEGATIVE
PDW BLD-RTO: 13.7 % (ref 11.7–14.9)
PH, URINE: 7 (ref 5–8)
PHENCYCLIDINE, URINE: NEGATIVE
PLATELET # BLD: 283 K/CU MM (ref 140–440)
PMV BLD AUTO: 10 FL (ref 7.5–11.1)
POTASSIUM SERPL-SCNC: 4 MMOL/L (ref 3.5–5.1)
PROTEIN UA: 30 MG/DL
RBC # BLD: 4.9 M/CU MM (ref 4.2–5.4)
RBC URINE: 10 /HPF (ref 0–6)
SEGMENTED NEUTROPHILS ABSOLUTE COUNT: 8.9 K/CU MM
SEGMENTED NEUTROPHILS RELATIVE PERCENT: 75.1 % (ref 36–66)
SODIUM BLD-SCNC: 135 MMOL/L (ref 135–145)
SPECIFIC GRAVITY UA: 1.01 (ref 1–1.03)
SQUAMOUS EPITHELIAL: 89 /HPF
TOTAL IMMATURE NEUTOROPHIL: 0.06 K/CU MM
TOTAL NUCLEATED RBC: 0 K/CU MM
TOTAL PROTEIN: 7.5 GM/DL (ref 6.4–8.2)
UROBILINOGEN, URINE: 1 MG/DL (ref 0.2–1)
WBC # BLD: 11.9 K/CU MM (ref 4–10.5)
WBC UA: 5 /HPF (ref 0–5)

## 2022-03-17 PROCEDURE — 6360000004 HC RX CONTRAST MEDICATION: Performed by: EMERGENCY MEDICINE

## 2022-03-17 PROCEDURE — 96361 HYDRATE IV INFUSION ADD-ON: CPT

## 2022-03-17 PROCEDURE — 99285 EMERGENCY DEPT VISIT HI MDM: CPT

## 2022-03-17 PROCEDURE — 76856 US EXAM PELVIC COMPLETE: CPT

## 2022-03-17 PROCEDURE — 85025 COMPLETE CBC W/AUTO DIFF WBC: CPT

## 2022-03-17 PROCEDURE — 96375 TX/PRO/DX INJ NEW DRUG ADDON: CPT

## 2022-03-17 PROCEDURE — 6370000000 HC RX 637 (ALT 250 FOR IP): Performed by: EMERGENCY MEDICINE

## 2022-03-17 PROCEDURE — 6360000002 HC RX W HCPCS: Performed by: NURSE PRACTITIONER

## 2022-03-17 PROCEDURE — 83690 ASSAY OF LIPASE: CPT

## 2022-03-17 PROCEDURE — 93975 VASCULAR STUDY: CPT

## 2022-03-17 PROCEDURE — 2500000003 HC RX 250 WO HCPCS: Performed by: EMERGENCY MEDICINE

## 2022-03-17 PROCEDURE — 96372 THER/PROPH/DIAG INJ SC/IM: CPT

## 2022-03-17 PROCEDURE — 1200000000 HC SEMI PRIVATE

## 2022-03-17 PROCEDURE — 6360000002 HC RX W HCPCS: Performed by: INTERNAL MEDICINE

## 2022-03-17 PROCEDURE — 74018 RADEX ABDOMEN 1 VIEW: CPT

## 2022-03-17 PROCEDURE — 80053 COMPREHEN METABOLIC PANEL: CPT

## 2022-03-17 PROCEDURE — 76830 TRANSVAGINAL US NON-OB: CPT

## 2022-03-17 PROCEDURE — 74177 CT ABD & PELVIS W/CONTRAST: CPT

## 2022-03-17 PROCEDURE — 96376 TX/PRO/DX INJ SAME DRUG ADON: CPT

## 2022-03-17 PROCEDURE — 96374 THER/PROPH/DIAG INJ IV PUSH: CPT

## 2022-03-17 PROCEDURE — 81001 URINALYSIS AUTO W/SCOPE: CPT

## 2022-03-17 PROCEDURE — 99253 IP/OBS CNSLTJ NEW/EST LOW 45: CPT | Performed by: SURGERY

## 2022-03-17 PROCEDURE — 84702 CHORIONIC GONADOTROPIN TEST: CPT

## 2022-03-17 PROCEDURE — 80307 DRUG TEST PRSMV CHEM ANLYZR: CPT

## 2022-03-17 PROCEDURE — 6360000002 HC RX W HCPCS: Performed by: EMERGENCY MEDICINE

## 2022-03-17 PROCEDURE — 2580000003 HC RX 258: Performed by: INTERNAL MEDICINE

## 2022-03-17 PROCEDURE — 2580000003 HC RX 258: Performed by: EMERGENCY MEDICINE

## 2022-03-17 RX ORDER — ONDANSETRON 2 MG/ML
4 INJECTION INTRAMUSCULAR; INTRAVENOUS EVERY 30 MIN PRN
Status: DISCONTINUED | OUTPATIENT
Start: 2022-03-17 | End: 2022-03-19 | Stop reason: HOSPADM

## 2022-03-17 RX ORDER — MORPHINE SULFATE 2 MG/ML
4 INJECTION, SOLUTION INTRAMUSCULAR; INTRAVENOUS EVERY 30 MIN PRN
Status: DISCONTINUED | OUTPATIENT
Start: 2022-03-17 | End: 2022-03-19 | Stop reason: HOSPADM

## 2022-03-17 RX ORDER — FLUTICASONE PROPIONATE 50 MCG
1 SPRAY, SUSPENSION (ML) NASAL DAILY
Status: DISCONTINUED | OUTPATIENT
Start: 2022-03-17 | End: 2022-03-19 | Stop reason: HOSPADM

## 2022-03-17 RX ORDER — ONDANSETRON 2 MG/ML
4 INJECTION INTRAMUSCULAR; INTRAVENOUS EVERY 6 HOURS PRN
Status: DISCONTINUED | OUTPATIENT
Start: 2022-03-17 | End: 2022-03-19 | Stop reason: HOSPADM

## 2022-03-17 RX ORDER — IBUPROFEN 600 MG/1
1 TABLET ORAL AS NEEDED
Status: ON HOLD | COMMUNITY
Start: 2022-01-07 | End: 2022-03-19 | Stop reason: HOSPADM

## 2022-03-17 RX ORDER — POTASSIUM CHLORIDE 7.45 MG/ML
10 INJECTION INTRAVENOUS PRN
Status: DISCONTINUED | OUTPATIENT
Start: 2022-03-17 | End: 2022-03-19 | Stop reason: HOSPADM

## 2022-03-17 RX ORDER — ACETAMINOPHEN 650 MG/1
650 SUPPOSITORY RECTAL EVERY 6 HOURS PRN
Status: DISCONTINUED | OUTPATIENT
Start: 2022-03-17 | End: 2022-03-19 | Stop reason: HOSPADM

## 2022-03-17 RX ORDER — 0.9 % SODIUM CHLORIDE 0.9 %
1000 INTRAVENOUS SOLUTION INTRAVENOUS ONCE
Status: COMPLETED | OUTPATIENT
Start: 2022-03-17 | End: 2022-03-17

## 2022-03-17 RX ORDER — DEXTROSE AND SODIUM CHLORIDE 5; .45 G/100ML; G/100ML
INJECTION, SOLUTION INTRAVENOUS CONTINUOUS
Status: DISCONTINUED | OUTPATIENT
Start: 2022-03-17 | End: 2022-03-19 | Stop reason: HOSPADM

## 2022-03-17 RX ORDER — CETIRIZINE HYDROCHLORIDE 10 MG/1
10 TABLET ORAL DAILY
Status: DISCONTINUED | OUTPATIENT
Start: 2022-03-17 | End: 2022-03-19 | Stop reason: HOSPADM

## 2022-03-17 RX ORDER — DIPHENHYDRAMINE HYDROCHLORIDE 50 MG/ML
25 INJECTION INTRAMUSCULAR; INTRAVENOUS ONCE
Status: COMPLETED | OUTPATIENT
Start: 2022-03-17 | End: 2022-03-17

## 2022-03-17 RX ORDER — ACETAMINOPHEN 325 MG/1
650 TABLET ORAL EVERY 6 HOURS PRN
Status: DISCONTINUED | OUTPATIENT
Start: 2022-03-17 | End: 2022-03-19 | Stop reason: HOSPADM

## 2022-03-17 RX ORDER — MORPHINE SULFATE 2 MG/ML
2 INJECTION, SOLUTION INTRAMUSCULAR; INTRAVENOUS EVERY 4 HOURS PRN
Status: DISCONTINUED | OUTPATIENT
Start: 2022-03-17 | End: 2022-03-19 | Stop reason: HOSPADM

## 2022-03-17 RX ORDER — NAPROXEN 250 MG/1
500 TABLET ORAL 2 TIMES DAILY PRN
Status: DISCONTINUED | OUTPATIENT
Start: 2022-03-17 | End: 2022-03-19 | Stop reason: HOSPADM

## 2022-03-17 RX ORDER — SODIUM CHLORIDE 0.9 % (FLUSH) 0.9 %
5-40 SYRINGE (ML) INJECTION EVERY 12 HOURS SCHEDULED
Status: DISCONTINUED | OUTPATIENT
Start: 2022-03-17 | End: 2022-03-19 | Stop reason: HOSPADM

## 2022-03-17 RX ORDER — SODIUM CHLORIDE 0.9 % (FLUSH) 0.9 %
10 SYRINGE (ML) INJECTION PRN
Status: DISCONTINUED | OUTPATIENT
Start: 2022-03-17 | End: 2022-03-19 | Stop reason: HOSPADM

## 2022-03-17 RX ORDER — MAGNESIUM HYDROXIDE/ALUMINUM HYDROXICE/SIMETHICONE 120; 1200; 1200 MG/30ML; MG/30ML; MG/30ML
30 SUSPENSION ORAL ONCE
Status: COMPLETED | OUTPATIENT
Start: 2022-03-17 | End: 2022-03-17

## 2022-03-17 RX ORDER — ONDANSETRON 4 MG/1
4 TABLET, ORALLY DISINTEGRATING ORAL EVERY 8 HOURS PRN
Status: DISCONTINUED | OUTPATIENT
Start: 2022-03-17 | End: 2022-03-19 | Stop reason: HOSPADM

## 2022-03-17 RX ORDER — PROCHLORPERAZINE EDISYLATE 5 MG/ML
10 INJECTION INTRAMUSCULAR; INTRAVENOUS ONCE
Status: COMPLETED | OUTPATIENT
Start: 2022-03-17 | End: 2022-03-17

## 2022-03-17 RX ORDER — BUTALBITAL, ACETAMINOPHEN AND CAFFEINE 50; 325; 40 MG/1; MG/1; MG/1
1 TABLET ORAL EVERY 4 HOURS PRN
Status: DISCONTINUED | OUTPATIENT
Start: 2022-03-17 | End: 2022-03-19 | Stop reason: HOSPADM

## 2022-03-17 RX ORDER — POLYETHYLENE GLYCOL 3350 17 G/17G
17 POWDER, FOR SOLUTION ORAL DAILY PRN
Status: DISCONTINUED | OUTPATIENT
Start: 2022-03-17 | End: 2022-03-19 | Stop reason: HOSPADM

## 2022-03-17 RX ORDER — SODIUM CHLORIDE 9 MG/ML
25 INJECTION, SOLUTION INTRAVENOUS PRN
Status: DISCONTINUED | OUTPATIENT
Start: 2022-03-17 | End: 2022-03-19 | Stop reason: HOSPADM

## 2022-03-17 RX ORDER — DIPHENHYDRAMINE HCL 25 MG
25 CAPSULE ORAL EVERY 6 HOURS PRN
Status: DISCONTINUED | OUTPATIENT
Start: 2022-03-17 | End: 2022-03-19 | Stop reason: HOSPADM

## 2022-03-17 RX ORDER — ACETAMINOPHEN 500 MG
500 TABLET ORAL EVERY 6 HOURS PRN
Status: DISCONTINUED | OUTPATIENT
Start: 2022-03-17 | End: 2022-03-19 | Stop reason: HOSPADM

## 2022-03-17 RX ORDER — DICYCLOMINE HYDROCHLORIDE 10 MG/ML
20 INJECTION INTRAMUSCULAR ONCE
Status: COMPLETED | OUTPATIENT
Start: 2022-03-17 | End: 2022-03-17

## 2022-03-17 RX ORDER — SODIUM CHLORIDE 0.9 % (FLUSH) 0.9 %
5-40 SYRINGE (ML) INJECTION PRN
Status: DISCONTINUED | OUTPATIENT
Start: 2022-03-17 | End: 2022-03-19 | Stop reason: HOSPADM

## 2022-03-17 RX ORDER — POTASSIUM CHLORIDE 20 MEQ/1
40 TABLET, EXTENDED RELEASE ORAL PRN
Status: DISCONTINUED | OUTPATIENT
Start: 2022-03-17 | End: 2022-03-19 | Stop reason: HOSPADM

## 2022-03-17 RX ADMIN — SODIUM CHLORIDE, PRESERVATIVE FREE 10 ML: 5 INJECTION INTRAVENOUS at 10:46

## 2022-03-17 RX ADMIN — IOPAMIDOL 80 ML: 755 INJECTION, SOLUTION INTRAVENOUS at 10:45

## 2022-03-17 RX ADMIN — ONDANSETRON 4 MG: 2 INJECTION INTRAMUSCULAR; INTRAVENOUS at 11:59

## 2022-03-17 RX ADMIN — SODIUM CHLORIDE 1000 ML: 9 INJECTION, SOLUTION INTRAVENOUS at 10:07

## 2022-03-17 RX ADMIN — ALUMINUM HYDROXIDE, MAGNESIUM HYDROXIDE, AND SIMETHICONE 30 ML: 200; 200; 20 SUSPENSION ORAL at 10:06

## 2022-03-17 RX ADMIN — ONDANSETRON 4 MG: 2 INJECTION INTRAMUSCULAR; INTRAVENOUS at 10:06

## 2022-03-17 RX ADMIN — MORPHINE SULFATE 4 MG: 2 INJECTION, SOLUTION INTRAMUSCULAR; INTRAVENOUS at 11:59

## 2022-03-17 RX ADMIN — PROCHLORPERAZINE EDISYLATE 10 MG: 5 INJECTION INTRAMUSCULAR; INTRAVENOUS at 22:44

## 2022-03-17 RX ADMIN — FAMOTIDINE 20 MG: 10 INJECTION INTRAVENOUS at 10:06

## 2022-03-17 RX ADMIN — ENOXAPARIN SODIUM 40 MG: 100 INJECTION SUBCUTANEOUS at 18:17

## 2022-03-17 RX ADMIN — TOPICAL ANESTHETIC: 200 SPRAY DENTAL; PERIODONTAL at 14:56

## 2022-03-17 RX ADMIN — DICYCLOMINE HYDROCHLORIDE 20 MG: 20 INJECTION INTRAMUSCULAR at 10:06

## 2022-03-17 RX ADMIN — MORPHINE SULFATE 2 MG: 2 INJECTION, SOLUTION INTRAMUSCULAR; INTRAVENOUS at 18:17

## 2022-03-17 RX ADMIN — DEXTROSE AND SODIUM CHLORIDE: 5; 450 INJECTION, SOLUTION INTRAVENOUS at 18:18

## 2022-03-17 RX ADMIN — MORPHINE SULFATE 2 MG: 2 INJECTION, SOLUTION INTRAMUSCULAR; INTRAVENOUS at 22:44

## 2022-03-17 RX ADMIN — DIPHENHYDRAMINE HYDROCHLORIDE 25 MG: 50 INJECTION, SOLUTION INTRAMUSCULAR; INTRAVENOUS at 22:44

## 2022-03-17 ASSESSMENT — PAIN - FUNCTIONAL ASSESSMENT: PAIN_FUNCTIONAL_ASSESSMENT: 0-10

## 2022-03-17 ASSESSMENT — PAIN DESCRIPTION - LOCATION
LOCATION: ABDOMEN

## 2022-03-17 ASSESSMENT — ENCOUNTER SYMPTOMS
CONSTIPATION: 1
NAUSEA: 1
EYES NEGATIVE: 1
RESPIRATORY NEGATIVE: 1
ALLERGIC/IMMUNOLOGIC NEGATIVE: 1
ABDOMINAL PAIN: 1

## 2022-03-17 ASSESSMENT — PAIN SCALES - GENERAL
PAINLEVEL_OUTOF10: 10

## 2022-03-17 ASSESSMENT — PAIN DESCRIPTION - PAIN TYPE
TYPE: ACUTE PAIN
TYPE: ACUTE PAIN

## 2022-03-17 ASSESSMENT — PAIN DESCRIPTION - DESCRIPTORS: DESCRIPTORS: CONSTANT

## 2022-03-17 ASSESSMENT — PAIN DESCRIPTION - FREQUENCY: FREQUENCY: CONTINUOUS
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
## 2022-03-17 NOTE — PROGRESS NOTES
Placed 16 Upper sorbian NG without difficulty; pt vomiting clear liquid; xray sts it is curled in her throat. Pulled NG back to uncurl & pt asked me to remove it. Pt is refusing.

## 2022-03-18 ENCOUNTER — APPOINTMENT (OUTPATIENT)
Dept: GENERAL RADIOLOGY | Age: 27
DRG: 247 | End: 2022-03-18
Payer: COMMERCIAL

## 2022-03-18 LAB
ANION GAP SERPL CALCULATED.3IONS-SCNC: 14 MMOL/L (ref 4–16)
BASOPHILS ABSOLUTE: 0 K/CU MM
BASOPHILS RELATIVE PERCENT: 0.2 % (ref 0–1)
BUN BLDV-MCNC: 6 MG/DL (ref 6–23)
CALCIUM SERPL-MCNC: 8.5 MG/DL (ref 8.3–10.6)
CHLORIDE BLD-SCNC: 103 MMOL/L (ref 99–110)
CO2: 23 MMOL/L (ref 21–32)
CREAT SERPL-MCNC: 0.6 MG/DL (ref 0.6–1.1)
DIFFERENTIAL TYPE: ABNORMAL
EOSINOPHILS ABSOLUTE: 0.1 K/CU MM
EOSINOPHILS RELATIVE PERCENT: 0.7 % (ref 0–3)
GFR AFRICAN AMERICAN: >60 ML/MIN/1.73M2
GFR NON-AFRICAN AMERICAN: >60 ML/MIN/1.73M2
GLUCOSE BLD-MCNC: 119 MG/DL (ref 70–99)
HCT VFR BLD CALC: 40 % (ref 37–47)
HEMOGLOBIN: 12.6 GM/DL (ref 12.5–16)
IMMATURE NEUTROPHIL %: 0.2 % (ref 0–0.43)
LYMPHOCYTES ABSOLUTE: 1.4 K/CU MM
LYMPHOCYTES RELATIVE PERCENT: 16.6 % (ref 24–44)
MCH RBC QN AUTO: 30.2 PG (ref 27–31)
MCHC RBC AUTO-ENTMCNC: 31.5 % (ref 32–36)
MCV RBC AUTO: 95.9 FL (ref 78–100)
MONOCYTES ABSOLUTE: 2 K/CU MM
MONOCYTES RELATIVE PERCENT: 24.4 % (ref 0–4)
NUCLEATED RBC %: 0 %
PDW BLD-RTO: 13.7 % (ref 11.7–14.9)
PLATELET # BLD: 221 K/CU MM (ref 140–440)
PMV BLD AUTO: 10.5 FL (ref 7.5–11.1)
POTASSIUM SERPL-SCNC: 3.8 MMOL/L (ref 3.5–5.1)
RBC # BLD: 4.17 M/CU MM (ref 4.2–5.4)
SEGMENTED NEUTROPHILS ABSOLUTE COUNT: 4.7 K/CU MM
SEGMENTED NEUTROPHILS RELATIVE PERCENT: 57.9 % (ref 36–66)
SODIUM BLD-SCNC: 140 MMOL/L (ref 135–145)
TOTAL IMMATURE NEUTOROPHIL: 0.02 K/CU MM
TOTAL NUCLEATED RBC: 0 K/CU MM
WBC # BLD: 8.2 K/CU MM (ref 4–10.5)

## 2022-03-18 PROCEDURE — 1200000000 HC SEMI PRIVATE

## 2022-03-18 PROCEDURE — 36415 COLL VENOUS BLD VENIPUNCTURE: CPT

## 2022-03-18 PROCEDURE — 99232 SBSQ HOSP IP/OBS MODERATE 35: CPT | Performed by: SURGERY

## 2022-03-18 PROCEDURE — 6370000000 HC RX 637 (ALT 250 FOR IP): Performed by: INTERNAL MEDICINE

## 2022-03-18 PROCEDURE — 80048 BASIC METABOLIC PNL TOTAL CA: CPT

## 2022-03-18 PROCEDURE — 6360000002 HC RX W HCPCS: Performed by: INTERNAL MEDICINE

## 2022-03-18 PROCEDURE — 74018 RADEX ABDOMEN 1 VIEW: CPT

## 2022-03-18 PROCEDURE — 94761 N-INVAS EAR/PLS OXIMETRY MLT: CPT

## 2022-03-18 PROCEDURE — 85025 COMPLETE CBC W/AUTO DIFF WBC: CPT

## 2022-03-18 RX ORDER — PROMETHAZINE HYDROCHLORIDE 25 MG/ML
12.5 INJECTION, SOLUTION INTRAMUSCULAR; INTRAVENOUS EVERY 6 HOURS PRN
Status: DISCONTINUED | OUTPATIENT
Start: 2022-03-18 | End: 2022-03-19 | Stop reason: ALTCHOICE

## 2022-03-18 RX ADMIN — PROMETHAZINE HYDROCHLORIDE 12.5 MG: 25 INJECTION INTRAMUSCULAR; INTRAVENOUS at 22:41

## 2022-03-18 RX ADMIN — PROMETHAZINE HYDROCHLORIDE 12.5 MG: 25 INJECTION INTRAMUSCULAR; INTRAVENOUS at 10:06

## 2022-03-18 RX ADMIN — MORPHINE SULFATE 2 MG: 2 INJECTION, SOLUTION INTRAMUSCULAR; INTRAVENOUS at 14:17

## 2022-03-18 RX ADMIN — MORPHINE SULFATE 2 MG: 2 INJECTION, SOLUTION INTRAMUSCULAR; INTRAVENOUS at 10:06

## 2022-03-18 RX ADMIN — MORPHINE SULFATE 2 MG: 2 INJECTION, SOLUTION INTRAMUSCULAR; INTRAVENOUS at 18:56

## 2022-03-18 RX ADMIN — ACETAMINOPHEN 650 MG: 325 TABLET ORAL at 14:51

## 2022-03-18 RX ADMIN — MORPHINE SULFATE 2 MG: 2 INJECTION, SOLUTION INTRAMUSCULAR; INTRAVENOUS at 03:42

## 2022-03-18 ASSESSMENT — PAIN SCALES - GENERAL
PAINLEVEL_OUTOF10: 10
PAINLEVEL_OUTOF10: 8
PAINLEVEL_OUTOF10: 10
PAINLEVEL_OUTOF10: 10
PAINLEVEL_OUTOF10: 0
PAINLEVEL_OUTOF10: 10

## 2022-03-18 ASSESSMENT — ENCOUNTER SYMPTOMS
CHEST TIGHTNESS: 0
EYE REDNESS: 0
SORE THROAT: 0
DIARRHEA: 0
EYE DISCHARGE: 0
NAUSEA: 1
COLOR CHANGE: 0
VOMITING: 1
SHORTNESS OF BREATH: 0
ABDOMINAL DISTENTION: 0
ABDOMINAL PAIN: 1

## 2022-03-18 ASSESSMENT — PAIN DESCRIPTION - ORIENTATION: ORIENTATION: LOWER;UPPER

## 2022-03-18 ASSESSMENT — PAIN DESCRIPTION - FREQUENCY: FREQUENCY: INTERMITTENT

## 2022-03-18 ASSESSMENT — PAIN DESCRIPTION - ONSET: ONSET: ON-GOING

## 2022-03-18 ASSESSMENT — PAIN DESCRIPTION - PROGRESSION: CLINICAL_PROGRESSION: NOT CHANGED

## 2022-03-18 ASSESSMENT — PAIN DESCRIPTION - DESCRIPTORS: DESCRIPTORS: DISCOMFORT

## 2022-03-18 ASSESSMENT — PAIN DESCRIPTION - PAIN TYPE
TYPE: ACUTE PAIN
TYPE: ACUTE PAIN

## 2022-03-18 ASSESSMENT — PAIN DESCRIPTION - LOCATION
LOCATION: ABDOMEN
LOCATION: ABDOMEN

## 2022-03-18 NOTE — PLAN OF CARE
Problem: Pain:  Description: Pain management should include both nonpharmacologic and pharmacologic interventions.   Goal: Pain level will decrease  Description: Pain level will decrease  6/74/6861 1470 by Dino Kurtz RN  Outcome: Ongoing  3/17/2022 1841 by Alem Lamas RN  Outcome: Ongoing  Goal: Control of acute pain  Description: Control of acute pain  7/73/9141 4264 by Dino Kurtz RN  Outcome: Ongoing  3/17/2022 1841 by Alem Lamas RN  Outcome: Ongoing  Goal: Control of chronic pain  Description: Control of chronic pain  9/72/7386 2663 by Dino Kurtz RN  Outcome: Ongoing  3/17/2022 1841 by Alem Lamas RN  Outcome: Ongoing  Goal: Patient's pain/discomfort is manageable  Description: Patient's pain/discomfort is manageable  3/50/3363 7711 by Dino Kurtz RN  Outcome: Ongoing  3/17/2022 1841 by Alem Lamas RN  Outcome: Ongoing     Problem: Infection:  Goal: Will remain free from infection  Description: Will remain free from infection  5/41/3503 4029 by Dino Kurtz RN  Outcome: Ongoing  3/17/2022 1841 by Alem Lamas RN  Outcome: Ongoing     Problem: Safety:  Goal: Free from accidental physical injury  Description: Free from accidental physical injury  1/34/9345 4064 by Dino Kurtz RN  Outcome: Ongoing  3/17/2022 1841 by Alem Lamas RN  Outcome: Ongoing  Goal: Free from intentional harm  Description: Free from intentional harm  3/16/0472 5339 by Dino Kurtz RN  Outcome: Ongoing  3/17/2022 1841 by Alem Lamas RN  Outcome: Ongoing     Problem: Daily Care:  Goal: Daily care needs are met  Description: Daily care needs are met  3/19/0395 6260 by Dino Kurtz RN  Outcome: Ongoing  3/17/2022 1841 by Alem Lamas RN  Outcome: Ongoing     Problem: Skin Integrity:  Goal: Skin integrity will stabilize  Description: Skin integrity will stabilize  0/79/5358 3520 by Dino Kurtz RN  Outcome: Ongoing  3/17/2022 1841 by Alem Lamas RN  Outcome: Ongoing     Problem: Discharge Planning:  Goal: Patients continuum of care needs are met  Description: Patients continuum of care needs are met  7/51/4397 3351 by Frank Daugherty, RN  Outcome: Ongoing  3/17/2022 1841 by Amy Hannon, RN  Outcome: Ongoing

## 2022-03-18 NOTE — PROGRESS NOTES
[facility]    PATIENT: Christine Maza, 32 y.o., female, MRN: [id number]    Hospital Day:  LOS: 1 day     Christine Maza is a 32 y.o. female with history of IBS, presenting with nausea, abdominal pain, and change in bowel habits; CT scan with partial small bowel obstruction, and enteritis              Subjective:  Chief Complaint: abdominal pain  Pain: 10/10  BM: yes, liquid   Diet: ADULT DIET; Full Liquid  Activity: as tolerated    Stable overnight. She had one liquid bowel movement overnight.,  Denies blood in it. She feels bloated and has nonfocal abdominal pain. Denies nausea or vomiting. Feels hungry and wants to try full liquids, has been tolerating clears. Objective:    Vitals: /68   Pulse 80   Temp 98.7 °F (37.1 °C) (Oral)   Resp 14   Wt 144 lb 6.4 oz (65.5 kg)   LMP 2022   SpO2 98%   BMI 23.66 kg/m²   Vital Signs (Last 24 Hours)  Temp  Av.1 °F (37.3 °C)  Min: 98.7 °F (37.1 °C)  Max: 99.6 °F (37.6 °C)  Pulse  Av.3  Min: 73  Max: 96  BP  Min: 98/59  Max: 118/72  Resp  Av.3  Min: 14  Max: 18  SpO2  Av.4 %  Min: 97 %  Max: 100 %  Wt Readings from Last 3 Encounters:   22 144 lb 6.4 oz (65.5 kg)   21 150 lb (68 kg)   19 125 lb (56.7 kg)       I/O: No intake/output data recorded.     IV Fluids: sodium chloride    dextrose 5 % and 0.45 % NaCl Last Rate: 100 mL/hr at 22 1818    Scheduled Meds: fluticasone, 1 spray, Each Nostril, Daily    cetirizine, 10 mg, Oral, Daily    sodium chloride flush, 5-40 mL, IntraVENous, 2 times per day    enoxaparin, 40 mg, SubCUTAneous, Daily    Physical Exam:  General Appearance:   Alert, cooperative, no distress    Head:   Normocephalic, atraumatic    Lungs:    Equal chest rise, respirations unlabored   Heart:   Regular rate and rhythm    Abdomen:    Soft, nonfocally tender, no rebound or guarding    Extremities:  No cyanosis or edema    Neurologic: Nonfocal, grossly intact        Labs/Imaging Results:   Recent Results (from the past 24 hour(s))   CBC with Auto Differential    Collection Time: 03/18/22  1:30 AM   Result Value Ref Range    WBC 8.2 4.0 - 10.5 K/CU MM    RBC 4.17 (L) 4.2 - 5.4 M/CU MM    Hemoglobin 12.6 12.5 - 16.0 GM/DL    Hematocrit 40.0 37 - 47 %    MCV 95.9 78 - 100 FL    MCH 30.2 27 - 31 PG    MCHC 31.5 (L) 32.0 - 36.0 %    RDW 13.7 11.7 - 14.9 %    Platelets 471 794 - 543 K/CU MM    MPV 10.5 7.5 - 11.1 FL    Differential Type AUTOMATED DIFFERENTIAL     Segs Relative 57.9 36 - 66 %    Lymphocytes % 16.6 (L) 24 - 44 %    Monocytes % 24.4 (H) 0 - 4 %    Eosinophils % 0.7 0 - 3 %    Basophils % 0.2 0 - 1 %    Segs Absolute 4.7 K/CU MM    Lymphocytes Absolute 1.4 K/CU MM    Monocytes Absolute 2.0 K/CU MM    Eosinophils Absolute 0.1 K/CU MM    Basophils Absolute 0.0 K/CU MM    Nucleated RBC % 0.0 %    Total Nucleated RBC 0.0 K/CU MM    Total Immature Neutrophil 0.02 K/CU MM    Immature Neutrophil % 0.2 0 - 0.43 %   Basic Metabolic Panel w/ Reflex to MG    Collection Time: 03/18/22  1:30 AM   Result Value Ref Range    Sodium 140 135 - 145 MMOL/L    Potassium 3.8 3.5 - 5.1 MMOL/L    Chloride 103 99 - 110 mMol/L    CO2 23 21 - 32 MMOL/L    Anion Gap 14 4 - 16    BUN 6 6 - 23 MG/DL    CREATININE 0.6 0.6 - 1.1 MG/DL    Glucose 119 (H) 70 - 99 MG/DL    Calcium 8.5 8.3 - 10.6 MG/DL    GFR Non-African American >60 >60 mL/min/1.73m2    GFR African American >60 >60 mL/min/1.73m2         Assessment:  Nathan Day is a 32 y.o. female with history of IBS, presenting with nausea, abdominal pain, and change in bowel habits; CT scan with partial small bowel obstruction, and enteritis     Principal Problem:    SBO (small bowel obstruction) (HCC)  Resolved Problems:    * No resolved hospital problems. *      Plan:  · Discussed findings and options with Nathan Day. Her presentation appears concerning for a inflammatory versus infectious process. Currently she is not fully obstructed, and has not had active vomiting except for when it was self-induced. · We will hold off on NG tube placement for now as she had one previously and it was coiled in the esophagus, she declined replacement. Consider NG tube placement if having vomiting  · Partial small bowel obstruction- KUB stable. She had a bowel movement overnight which was liquid. · Change in bowel habits, diarrhea, reduced stool frequency-we will obtain a GI disease panel to help rule out infectious etiology  · History of IBS-follows with a gastroenterologist in [place]. Records do not appear to be available in Care Everywhere.   Agree with GI consult given the possibility of IBD and enteritis on her CT scan  · Will continue to follow her clinical progress  · Ok to advance diet as tolerated from a General Surgery standpoint  · Thank you for the consultation and the opportunity to care for Nathan Day  ·     Electronically signed: Pamela Galloway MD 3/18/2022 11:06 AM

## 2022-03-18 NOTE — CARE COORDINATION
Chart reviewed and screened for possible needs at discharge. Pt lives at home and was independent PTA. Per nursing charting, pt remains independent with ambulation in hospital room. Pt does not have PCP, PCP list added to discharge instructions. Pt has insurance to help w/ RX's. CM available for possible needs at discharge.

## 2022-03-18 NOTE — PROGRESS NOTES
INTERNAL MED PROGRESS NOTE           Subjective: Patient reports improvement in nausea and abdominal distention this morning. Denies any vomiting, no diarrhea. Has no passed flatus yet. Assessment/Plan:       --Partial small bowel obstruction: Continue conservative management, currently has no vomiting and abdominal distention is improving, no NG required. General surgery following    --Nausea: Secondary to above, no vomiting so far. Continue antiemetics as ordered    --Adnexa mass/soft tissue: Chronic, ultrasound obtained on admission showed nonspecific findings. Follow-up with GYN outpatient. --IBS: Appears to be stable. Continue conservative management      DVT prophylaxis: Lovenox. Dispo: Possible discharge tomorrow pending clinical improvement. Sunitha Baird MD  3/18/2022 @ 2:03 PM           Objective:       No intake or output data in the 24 hours ending 03/18/22 1403     Vitals:   Vitals:    03/18/22 0813   BP: 111/68   Pulse: 80   Resp: 14   Temp: 98.7 °F (37.1 °C)   SpO2: 98%       Physical Exam:        Constitutional: Well developed, Well nourished, NAD  Neurologic: Alert & oriented x 3, No focal deficits noted. CNs II-XII grossly intact and symmetrical  Psychiatric: Affect normal, Mood normal.  HENT: Normocephalic, Atraumatic,  Eyes: PERRLA, EOMI, No pallor/scleral icterus. Neck: Normal range of motion, No tenderness, Supple, no bruit  Lymphatic: No cervical lymphadenopathy noted. Cardiovascular: Regular rate and rhythm, normal S1-S2, No murmurs, gallops or rubs. Thorax & Lungs: CTA bilaterally, No respiratory distress, No wheezing   Abdomen: Soft, BS +ve, no tenderness, no rebound or guarding  Skin: Warm, Dry, No erythema, No rash. Back: No tenderness, No CVA tenderness. Extremities: No edema, No tenderness  Musculoskeletal:. No major deformities noted.                Labs:   Reviewed, as follows:  Recent Results (from the past 24 hour(s))   CBC with Auto Differential    Collection Time: 03/18/22  1:30 AM   Result Value Ref Range    WBC 8.2 4.0 - 10.5 K/CU MM    RBC 4.17 (L) 4.2 - 5.4 M/CU MM    Hemoglobin 12.6 12.5 - 16.0 GM/DL    Hematocrit 40.0 37 - 47 %    MCV 95.9 78 - 100 FL    MCH 30.2 27 - 31 PG    MCHC 31.5 (L) 32.0 - 36.0 %    RDW 13.7 11.7 - 14.9 %    Platelets 654 973 - 975 K/CU MM    MPV 10.5 7.5 - 11.1 FL    Differential Type AUTOMATED DIFFERENTIAL     Segs Relative 57.9 36 - 66 %    Lymphocytes % 16.6 (L) 24 - 44 %    Monocytes % 24.4 (H) 0 - 4 %    Eosinophils % 0.7 0 - 3 %    Basophils % 0.2 0 - 1 %    Segs Absolute 4.7 K/CU MM    Lymphocytes Absolute 1.4 K/CU MM    Monocytes Absolute 2.0 K/CU MM    Eosinophils Absolute 0.1 K/CU MM    Basophils Absolute 0.0 K/CU MM    Nucleated RBC % 0.0 %    Total Nucleated RBC 0.0 K/CU MM    Total Immature Neutrophil 0.02 K/CU MM    Immature Neutrophil % 0.2 0 - 0.43 %   Basic Metabolic Panel w/ Reflex to MG    Collection Time: 03/18/22  1:30 AM   Result Value Ref Range    Sodium 140 135 - 145 MMOL/L    Potassium 3.8 3.5 - 5.1 MMOL/L    Chloride 103 99 - 110 mMol/L    CO2 23 21 - 32 MMOL/L    Anion Gap 14 4 - 16    BUN 6 6 - 23 MG/DL    CREATININE 0.6 0.6 - 1.1 MG/DL    Glucose 119 (H) 70 - 99 MG/DL    Calcium 8.5 8.3 - 10.6 MG/DL    GFR Non-African American >60 >60 mL/min/1.73m2    GFR African American >60 >60 mL/min/1.73m2          Body mass index is 23.66 kg/m².  Patient will follow up with PCP for further management as indicated unless otherwise specifically noted above        Laila Dietz MD  3/18/2022 @ 2:03 PM

## 2022-03-19 ENCOUNTER — APPOINTMENT (OUTPATIENT)
Dept: GENERAL RADIOLOGY | Age: 27
DRG: 247 | End: 2022-03-19
Payer: COMMERCIAL

## 2022-03-19 VITALS
DIASTOLIC BLOOD PRESSURE: 69 MMHG | SYSTOLIC BLOOD PRESSURE: 115 MMHG | TEMPERATURE: 99.6 F | RESPIRATION RATE: 16 BRPM | HEART RATE: 72 BPM | BODY MASS INDEX: 23.66 KG/M2 | OXYGEN SATURATION: 98 % | WEIGHT: 144.4 LBS

## 2022-03-19 PROCEDURE — 99233 SBSQ HOSP IP/OBS HIGH 50: CPT | Performed by: SURGERY

## 2022-03-19 PROCEDURE — 6360000002 HC RX W HCPCS: Performed by: INTERNAL MEDICINE

## 2022-03-19 PROCEDURE — 94761 N-INVAS EAR/PLS OXIMETRY MLT: CPT

## 2022-03-19 PROCEDURE — 6370000000 HC RX 637 (ALT 250 FOR IP): Performed by: INTERNAL MEDICINE

## 2022-03-19 PROCEDURE — 2580000003 HC RX 258: Performed by: INTERNAL MEDICINE

## 2022-03-19 PROCEDURE — 74018 RADEX ABDOMEN 1 VIEW: CPT

## 2022-03-19 RX ORDER — DICYCLOMINE HCL 20 MG
20 TABLET ORAL 4 TIMES DAILY
Qty: 28 TABLET | Refills: 0 | Status: SHIPPED | OUTPATIENT
Start: 2022-03-19 | End: 2022-03-29

## 2022-03-19 RX ORDER — PROMETHAZINE HYDROCHLORIDE 12.5 MG/1
12.5 TABLET ORAL 4 TIMES DAILY PRN
Qty: 20 TABLET | Refills: 0 | Status: SHIPPED | OUTPATIENT
Start: 2022-03-19 | End: 2022-03-26

## 2022-03-19 RX ADMIN — MORPHINE SULFATE 2 MG: 2 INJECTION, SOLUTION INTRAMUSCULAR; INTRAVENOUS at 04:54

## 2022-03-19 RX ADMIN — DEXTROSE AND SODIUM CHLORIDE: 5; 450 INJECTION, SOLUTION INTRAVENOUS at 04:56

## 2022-03-19 RX ADMIN — MORPHINE SULFATE 2 MG: 2 INJECTION, SOLUTION INTRAMUSCULAR; INTRAVENOUS at 09:24

## 2022-03-19 RX ADMIN — ENOXAPARIN SODIUM 40 MG: 100 INJECTION SUBCUTANEOUS at 08:52

## 2022-03-19 RX ADMIN — SODIUM CHLORIDE, PRESERVATIVE FREE 10 ML: 5 INJECTION INTRAVENOUS at 08:53

## 2022-03-19 RX ADMIN — CETIRIZINE HYDROCHLORIDE 10 MG: 10 TABLET, FILM COATED ORAL at 09:02

## 2022-03-19 RX ADMIN — PROMETHAZINE HYDROCHLORIDE: 25 INJECTION INTRAMUSCULAR; INTRAVENOUS at 08:50

## 2022-03-19 RX ADMIN — ACETAMINOPHEN 500 MG: 500 TABLET ORAL at 13:53

## 2022-03-19 RX ADMIN — MORPHINE SULFATE 2 MG: 2 INJECTION, SOLUTION INTRAMUSCULAR; INTRAVENOUS at 13:41

## 2022-03-19 RX ADMIN — MORPHINE SULFATE 2 MG: 2 INJECTION, SOLUTION INTRAMUSCULAR; INTRAVENOUS at 00:12

## 2022-03-19 ASSESSMENT — PAIN SCALES - GENERAL
PAINLEVEL_OUTOF10: 10
PAINLEVEL_OUTOF10: 9
PAINLEVEL_OUTOF10: 9
PAINLEVEL_OUTOF10: 10

## 2022-03-19 ASSESSMENT — PAIN DESCRIPTION - ORIENTATION: ORIENTATION: LOWER;MID;UPPER

## 2022-03-19 ASSESSMENT — PAIN DESCRIPTION - PROGRESSION
CLINICAL_PROGRESSION: NOT CHANGED
CLINICAL_PROGRESSION: NOT CHANGED

## 2022-03-19 ASSESSMENT — PAIN DESCRIPTION - ONSET: ONSET: ON-GOING

## 2022-03-19 ASSESSMENT — PAIN DESCRIPTION - FREQUENCY: FREQUENCY: INTERMITTENT

## 2022-03-19 ASSESSMENT — PAIN DESCRIPTION - PAIN TYPE: TYPE: ACUTE PAIN

## 2022-03-19 ASSESSMENT — PAIN DESCRIPTION - LOCATION: LOCATION: ABDOMEN

## 2022-03-19 ASSESSMENT — PAIN DESCRIPTION - DESCRIPTORS: DESCRIPTORS: DISCOMFORT

## 2022-03-19 NOTE — DISCHARGE SUMMARY
Hospitalist Discharge Summary         Name:  Quinn Carter Date/Time of Admission: 3/17/2022  9:09 AM    CSN: [id number] Attending Physician: Sabrina Kee MD   Room/Bed: 0489/2617-Y : 1995 Age: 32 y.o. Dear No primary care provider on file.,  It was my pleasure to care for your patient who was admitted to the hospital under my care and is now being discharged. The summary of the hospital course is dictated below. Please do not hesitate to call me at [phone] with any concerns or if you require additional information. Thank you for allowing me to participate in the care of your patient. Sincerely,    Reynold Vail. Marianna Villasenor MD  [address] [place] [address]  [phone]            DISCHARGE SUMMARY        Date of Admission: 3/17/2022  Date of Discharge: 3/19/2022  Facility: [address] [place] [address]. Reason for Admission: Partial small bowel obstruction    Consultants: General surgery    Procedures:     Significant Tests during this Admission:    SIGNIFICANT LABS  @LABRCNTIP(WBC:2,HEMOGLOBIN:2,HCT:2,PLT:2,RBC:2,MCW:2,MCHC:2,MCH:2,RDW:2,NEUTROPHILS:2,LYMPHOPERCEN:2,MONOCYTES:2,EOSINOPERCEN:2,BASOPHILPERC:2,METAMYELOCYT:2,PROMYMAN:2)@  @LABRCNTIP(NA:2,K:2,CL:2,CO2:2,BUN:2,CREATININE:2,GLU:2,CALCIUM:2)@    Radiological findings:  [unfilled]    Discharge Examination:  BP (!) 106/54   Pulse 75   Temp 97.4 °F (36.3 °C) (Oral)   Resp 16   Wt 144 lb 6.4 oz (65.5 kg)   LMP 2022   SpO2 97%   BMI 23.66 kg/m²   Alert oriented, no apparent distress  Lungs: Unlabored breathing, bilateral air entry +  Heart:S1, S2+  Abdomen soft, non-tender  Extremities no edema/cyanosis, no calf tenderness               Discharge Diagnoses:  1. Partial small bowel obstruction  2. Nausea alone  3. IBS  4.  Adnexal mass/soft tissue          Discharge Instructions and Follow up  Activity: activity as tolerated with fall precautions  Diet: cardiac diet  Call PCP/return to ER if symptoms recur. Wound Care: none needed    Follow up with PCP in 1 week. No follow-up provider specified. Discharge disposition:  Home    Condition on discharge:  Stable    Discharge Medications:     Medication List      START taking these medications    promethazine 12.5 MG tablet  Commonly known as: PHENERGAN  Take 1 tablet by mouth 4 times daily as needed for Nausea        CONTINUE taking these medications    acetaminophen 500 MG tablet  Commonly known as: APAP Extra Strength  Take 1 tablet by mouth every 6 hours as needed for Pain        STOP taking these medications    butalbital-aspirin-caffeine -40 MG per capsule  Commonly known as: FIORINAL     cetirizine 10 MG tablet  Commonly known as: ZYRTEC     diphenhydrAMINE 25 MG capsule  Commonly known as: BENADRYL     fluticasone 50 MCG/ACT nasal spray  Commonly known as: Flonase     ibuprofen 600 MG tablet  Commonly known as: ADVIL;MOTRIN     naproxen 500 MG tablet  Commonly known as: Naprosyn     predniSONE 10 MG tablet  Commonly known as: Myrtle Melva           Where to Get Your Medications      These medications were sent to [address] [place] [address], [facility]  [phone]  [address], [address] [place] [id number]    Phone: [phone]   · promethazine 12.5 MG tablet                Hospital Course:    Ms. Odalys Jimenez is a 32y.o. year old female who was admitted on 3/17/2022 for symptoms of nausea. Denied any abdominal pain, CT of the abdomen showed findings consistent with partial small bowel obstruction. Patient was admitted for symptomatic control. NG was attempted initially but patient did not tolerate. Given minimally distended abdomen and lack of active vomiting, she was treated conservatively without an NG tube.   Patient started tolerating p.o. intake within 12 hours of admission, she continued to tolerate p.o. intake. Repeat KUB today showed resolution of ileus. Patient is tolerating oral well and not in any pain. Being discharged home today in stable condition. Adnexa mass/soft tissue noted on CT, Chronic, ultrasound obtained on admission showed nonspecific findings. Follow-up with GYN outpatient. Patient asked the nurse multiple times to go down to smoke marijuana, this was declined because he was deemed inappropriate given hospitalize status.           Total discharge time reviewing clinical diagnoses, medications, follow up and coordinating discharge was 30 minutes      Electronically signed: Mikaela Sykes MD 3/19/2022

## 2022-03-19 NOTE — PROGRESS NOTES
[facility]    PATIENT: Yoandy Pitts, 32 y.o., female, MRN: [id number]    Hospital Day:  LOS: 2 days     Yoandy Pitts is a 32 y.o. female with history of IBS, presenting with nausea, abdominal pain, and change in bowel habits; CT scan with partial small bowel obstruction, and enteritis              Subjective:  Chief Complaint: abdominal pain, wants to leave, waiting to see me and NP to decide if she will leave  Pain: 10/10  BM: yes multiple, states one was even solid. Diet: ADULT DIET; Full Liquid  Activity: as tolerated    States the only thing that helps with her diet and abdominal pain / nausea is marijuana. States her body is dependent on it. States she hasn't been seen by GI yet. Objective:    Vitals: BP (!) 106/54   Pulse 75   Temp 97.4 °F (36.3 °C) (Oral)   Resp 16   Wt 144 lb 6.4 oz (65.5 kg)   LMP 2022   SpO2 97%   BMI 23.66 kg/m²   Vital Signs (Last 24 Hours)  Temp  Av.9 °F (37.2 °C)  Min: 97.4 °F (36.3 °C)  Max: 100.2 °F (37.9 °C)  Pulse  Av.7  Min: 75  Max: 88  BP  Min: 96/63  Max: 106/54  Resp  Av  Min: 15  Max: 19  SpO2  Av.5 %  Min: 97 %  Max: 98 %  Wt Readings from Last 3 Encounters:   22 144 lb 6.4 oz (65.5 kg)   21 150 lb (68 kg)   19 125 lb (56.7 kg)       I/O: No intake/output data recorded.     IV Fluids: sodium chloride    dextrose 5 % and 0.45 % NaCl Last Rate: 100 mL/hr at 22 0456    Scheduled Meds: fluticasone, 1 spray, Each Nostril, Daily    cetirizine, 10 mg, Oral, Daily    sodium chloride flush, 5-40 mL, IntraVENous, 2 times per day    enoxaparin, 40 mg, SubCUTAneous, Daily    Physical Exam:  General Appearance:   Alert, cooperative, no distress    Head:   Normocephalic, atraumatic    Lungs:    Equal chest rise, respirations unlabored   Heart:   Regular rate and rhythm    Abdomen:    Soft, nonfocally tender, no rebound or guarding    Extremities:  No cyanosis or edema    Neurologic:  Nonfocal, grossly intact        Labs/Imaging Results:   No results found for this or any previous visit (from the past 24 hour(s)). Assessment:  Nathan Day is a 32 y.o. female with history of IBS, presenting with nausea, abdominal pain, and change in bowel habits; CT scan with partial small bowel obstruction, and enteritis     Principal Problem:    SBO (small bowel obstruction) ([address]  Resolved Problems:    * No resolved hospital problems. *      Plan:  · Discussed findings and options with Nathan Day. Her presentation appears concerning for a inflammatory versus infectious process. Pt remains unobstructed. She is passing stool and some more solid. · No further emesis. Continue to hold off on NGT. · Pt does not want to have diet advanced. She states she really just needs marijuana. D/w pt, unfortunately, unable to provide with that request.   · Pt requesting KUB. Will order. Did d/w her that likely improved given she continues to have BMs. ·  GI disease panel still pending. · History of IBS-follows with a gastroenterologist in [place]. Records do not appear to be available in Care Everywhere. Agree with GI consult given the possibility of IBD and enteritis on her CT scan  · Will continue to follow her clinical progress  · Ok to advance diet as tolerated from a General Surgery standpoint  · Thank you for the consultation and the opportunity to care for Nathan Day. · Plan d/w pt, her friend at bedside, and the pt's nurse. Electronically signed: Gonsalo Linder II, MD 3/19/2022 11:46 AM     Addendum:    KUB completed. Nonobstructive bowel gas pattern.  Prior small bowel distension has resolved. No plans for General Surgery intervention. D/c per primary. Needs GI f/u.     Arnaldo Fothergill, MD

## 2022-03-29 ENCOUNTER — OFFICE VISIT (OUTPATIENT)
Dept: SURGERY | Age: 27
End: 2022-03-29
Payer: COMMERCIAL

## 2022-03-29 VITALS
WEIGHT: 136.1 LBS | SYSTOLIC BLOOD PRESSURE: 118 MMHG | DIASTOLIC BLOOD PRESSURE: 76 MMHG | HEIGHT: 65 IN | HEART RATE: 76 BPM | OXYGEN SATURATION: 97 % | BODY MASS INDEX: 22.67 KG/M2

## 2022-03-29 DIAGNOSIS — K58.9 IRRITABLE BOWEL SYNDROME, UNSPECIFIED TYPE: ICD-10-CM

## 2022-03-29 DIAGNOSIS — G89.29 CHRONIC ABDOMINAL PAIN: ICD-10-CM

## 2022-03-29 DIAGNOSIS — R10.9 CHRONIC ABDOMINAL PAIN: ICD-10-CM

## 2022-03-29 DIAGNOSIS — K56.609 SBO (SMALL BOWEL OBSTRUCTION) (HCC): Primary | ICD-10-CM

## 2022-03-29 PROCEDURE — 99213 OFFICE O/P EST LOW 20 MIN: CPT | Performed by: SURGERY

## 2022-03-29 PROCEDURE — G8484 FLU IMMUNIZE NO ADMIN: HCPCS | Performed by: SURGERY

## 2022-03-29 PROCEDURE — G8420 CALC BMI NORM PARAMETERS: HCPCS | Performed by: SURGERY

## 2022-03-29 PROCEDURE — 1036F TOBACCO NON-USER: CPT | Performed by: SURGERY

## 2022-03-29 PROCEDURE — G8427 DOCREV CUR MEDS BY ELIG CLIN: HCPCS | Performed by: SURGERY

## 2022-03-29 PROCEDURE — 1111F DSCHRG MED/CURRENT MED MERGE: CPT | Performed by: SURGERY

## 2022-03-29 RX ORDER — PROMETHAZINE HYDROCHLORIDE 12.5 MG/1
12.5 TABLET ORAL 4 TIMES DAILY
Qty: 56 TABLET | Refills: 2 | Status: SHIPPED | OUTPATIENT
Start: 2022-03-29 | End: 2022-05-10

## 2022-03-29 RX ORDER — DICYCLOMINE HCL 20 MG
20 TABLET ORAL 4 TIMES DAILY
Qty: 120 TABLET | Refills: 0 | Status: SHIPPED | OUTPATIENT
Start: 2022-03-29 | End: 2022-04-28

## 2022-03-29 ASSESSMENT — PATIENT HEALTH QUESTIONNAIRE - PHQ9
SUM OF ALL RESPONSES TO PHQ9 QUESTIONS 1 & 2: 0
SUM OF ALL RESPONSES TO PHQ QUESTIONS 1-9: 0
2. FEELING DOWN, DEPRESSED OR HOPELESS: 0
1. LITTLE INTEREST OR PLEASURE IN DOING THINGS: 0
SUM OF ALL RESPONSES TO PHQ QUESTIONS 1-9: 0
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
## 2022-03-29 NOTE — PATIENT INSTRUCTIONS
Patient Education        Low-Fiber Diet: Care Instructions  Overview     When your bowels are irritated, you may need to limit fiber in your diet until the problem clears up. Your doctor and dietitian can help you design a low-fiber diet based on your health and what you prefer to eat. Ask your doctor how long you should stay on a low-fiber diet. Your doctor probably will have you start adding more fiber to your diet as you get better. Always talk withyour doctor or dietitian before you make changes in your diet. Follow-up care is a key part of your treatment and safety. Be sure to make and go to all appointments, and call your doctor if you are having problems. It's also a good idea to know your test results and keep alist of the medicines you take. How can you care for yourself at home?  Choose white or refined grains, and avoid whole grains. That means eating white or refined cereals, breads, crackers, rice, or pasta.  Choose fruits and vegetables that have been peeled and cooked. Avoid fruits and vegetables that are raw or that have skins, seeds, or hulls. ? Eat cooked or canned fruit. Low-fiber fruits include applesauce, canned peaches, canned pears, and fruit juice without pulp. ? Eat low-fiber vegetables, which include well-cooked vegetables and vegetable juice.  Drink or eat milk, yogurt, or other milk products if you can digest dairy without too many problems. Your doctor may limit milk and milk products for a while. If so, they may recommend a calcium and vitamin D supplement.  Eat well-cooked meat, such as chicken, turkey, fish, or lean meat. You also can eat eggs and tofu.  Avoid these foods:  ? Bran, brown or wild rice, oatmeal, granola, corn, connor crackers, barley, and whole wheat and other whole-grain breads, such as rye bread  ? Cereals with more than 2 grams of fiber a serving  ? Berries, rhubarb, prunes, prune juice, and all dried fruits  ? Raw vegetables and fruits  ?  Foods that

## 2022-03-30 PROBLEM — K58.9 IBS (IRRITABLE BOWEL SYNDROME): Status: ACTIVE | Noted: 2022-03-30

## 2022-03-30 ASSESSMENT — ENCOUNTER SYMPTOMS
VOMITING: 1
SORE THROAT: 0
EYE REDNESS: 0
CHEST TIGHTNESS: 0
ABDOMINAL PAIN: 1
EYE DISCHARGE: 0
NAUSEA: 1
ABDOMINAL DISTENTION: 0
SHORTNESS OF BREATH: 0
CONSTIPATION: 1
COLOR CHANGE: 0
BLOOD IN STOOL: 1